# Patient Record
Sex: FEMALE | Race: WHITE | Employment: FULL TIME | URBAN - NONMETROPOLITAN AREA
[De-identification: names, ages, dates, MRNs, and addresses within clinical notes are randomized per-mention and may not be internally consistent; named-entity substitution may affect disease eponyms.]

---

## 2019-11-20 ENCOUNTER — OFFICE VISIT (OUTPATIENT)
Dept: PRIMARY CARE CLINIC | Age: 49
End: 2019-11-20
Payer: COMMERCIAL

## 2019-11-20 VITALS
HEART RATE: 104 BPM | HEIGHT: 66 IN | DIASTOLIC BLOOD PRESSURE: 80 MMHG | WEIGHT: 139 LBS | BODY MASS INDEX: 22.34 KG/M2 | SYSTOLIC BLOOD PRESSURE: 136 MMHG | OXYGEN SATURATION: 98 % | TEMPERATURE: 98.6 F

## 2019-11-20 DIAGNOSIS — M21.921 ACQUIRED DEFORMITY OF RIGHT UPPER ARM: Primary | ICD-10-CM

## 2019-11-20 DIAGNOSIS — G47.00 INSOMNIA, UNSPECIFIED TYPE: ICD-10-CM

## 2019-11-20 DIAGNOSIS — J30.9 ALLERGIC RHINITIS, UNSPECIFIED SEASONALITY, UNSPECIFIED TRIGGER: ICD-10-CM

## 2019-11-20 PROCEDURE — 99203 OFFICE O/P NEW LOW 30 MIN: CPT | Performed by: PEDIATRICS

## 2019-11-20 RX ORDER — FLUTICASONE PROPIONATE 50 MCG
2 SPRAY, SUSPENSION (ML) NASAL DAILY
Qty: 1 BOTTLE | Refills: 5 | Status: SHIPPED | OUTPATIENT
Start: 2019-11-20 | End: 2020-04-01 | Stop reason: SDUPTHER

## 2019-11-20 RX ORDER — QUETIAPINE 150 MG/1
150 TABLET, FILM COATED, EXTENDED RELEASE ORAL NIGHTLY
COMMUNITY
End: 2020-03-04 | Stop reason: SDUPTHER

## 2019-11-20 RX ORDER — MELOXICAM 15 MG/1
15 TABLET ORAL DAILY
Qty: 30 TABLET | Refills: 3 | Status: SHIPPED | OUTPATIENT
Start: 2019-11-20 | End: 2020-03-04

## 2019-11-20 ASSESSMENT — ENCOUNTER SYMPTOMS
RESPIRATORY NEGATIVE: 1
EYES NEGATIVE: 1
ALLERGIC/IMMUNOLOGIC NEGATIVE: 1
GASTROINTESTINAL NEGATIVE: 1

## 2019-11-22 ENCOUNTER — TELEPHONE (OUTPATIENT)
Dept: PRIMARY CARE CLINIC | Age: 49
End: 2019-11-22

## 2020-01-08 ENCOUNTER — HOSPITAL ENCOUNTER (OUTPATIENT)
Dept: NEUROLOGY | Age: 50
Discharge: HOME | End: 2020-01-08
Payer: COMMERCIAL

## 2020-01-08 PROCEDURE — 95886 MUSC TEST DONE W/N TEST COMP: CPT

## 2020-01-08 PROCEDURE — 95886 MUSC TEST DONE W/N TEST COMP: CPT | Performed by: PSYCHIATRY & NEUROLOGY

## 2020-01-08 PROCEDURE — 95909 NRV CNDJ TST 5-6 STUDIES: CPT

## 2020-01-08 PROCEDURE — 95909 NRV CNDJ TST 5-6 STUDIES: CPT | Performed by: PSYCHIATRY & NEUROLOGY

## 2020-03-04 ENCOUNTER — OFFICE VISIT (OUTPATIENT)
Dept: PRIMARY CARE CLINIC | Age: 50
End: 2020-03-04
Payer: COMMERCIAL

## 2020-03-04 VITALS
BODY MASS INDEX: 23.39 KG/M2 | SYSTOLIC BLOOD PRESSURE: 124 MMHG | DIASTOLIC BLOOD PRESSURE: 90 MMHG | TEMPERATURE: 99 F | HEART RATE: 82 BPM | HEIGHT: 67 IN | OXYGEN SATURATION: 98 % | WEIGHT: 149 LBS

## 2020-03-04 PROCEDURE — 99214 OFFICE O/P EST MOD 30 MIN: CPT | Performed by: PEDIATRICS

## 2020-03-04 RX ORDER — QUETIAPINE 150 MG/1
150 TABLET, FILM COATED, EXTENDED RELEASE ORAL NIGHTLY
Qty: 30 TABLET | Refills: 0 | Status: SHIPPED | OUTPATIENT
Start: 2020-03-04 | End: 2020-06-04 | Stop reason: ALTCHOICE

## 2020-03-04 RX ORDER — CLONAZEPAM 1 MG/1
1 TABLET ORAL 3 TIMES DAILY PRN
Qty: 90 TABLET | Refills: 0 | Status: SHIPPED | OUTPATIENT
Start: 2020-03-04 | End: 2020-06-04 | Stop reason: SDUPTHER

## 2020-03-04 RX ORDER — GABAPENTIN 100 MG/1
100 CAPSULE ORAL 3 TIMES DAILY
COMMUNITY
End: 2021-02-18

## 2020-03-04 RX ORDER — CLONAZEPAM 1 MG/1
1 TABLET ORAL 2 TIMES DAILY PRN
Qty: 60 TABLET | Refills: 0 | Status: CANCELLED | OUTPATIENT
Start: 2020-03-04 | End: 2020-04-19

## 2020-03-04 RX ORDER — CLONAZEPAM 1 MG/1
1 TABLET ORAL 2 TIMES DAILY PRN
COMMUNITY
End: 2020-03-04 | Stop reason: SDUPTHER

## 2020-03-04 ASSESSMENT — PATIENT HEALTH QUESTIONNAIRE - PHQ9
SUM OF ALL RESPONSES TO PHQ9 QUESTIONS 1 & 2: 0
1. LITTLE INTEREST OR PLEASURE IN DOING THINGS: 0
SUM OF ALL RESPONSES TO PHQ QUESTIONS 1-9: 0
SUM OF ALL RESPONSES TO PHQ QUESTIONS 1-9: 0
2. FEELING DOWN, DEPRESSED OR HOPELESS: 0

## 2020-03-04 ASSESSMENT — ENCOUNTER SYMPTOMS
ALLERGIC/IMMUNOLOGIC NEGATIVE: 1
GASTROINTESTINAL NEGATIVE: 1
RESPIRATORY NEGATIVE: 1
EYES NEGATIVE: 1

## 2020-03-04 NOTE — PROGRESS NOTES
[facility], [address]  Phone [phone]   Fax [phone]      OFFICE VISIT: 3/4/2020    Harman Greenberg-: 1970      [facility]  Reason For Visit:  Anais Gold is a 52 y.o. Follow-up; Health Maintenance (Cervical Cancer Screen, Mammogram, Colonoscopy, Flu shot, Pmeumonia Shot); and Medication Refill (Seroquel, Clonazepam)      Patient presents on follow-up. Present concerns:  She continues to have some anxiety and difficulty concentrating at times. In the past she was on Adderall for ADHD. She felt that this may have exacerbated her anxiety and she has stopped it. She was also on fluoxetine but did not feel that this helped much at all. She has weaned off of this as well. She does feel that clonazepam does help her. She does take this continued on a regular basis    She is requesting refills of some medications. She is requesting a refill of Seroquel. She takes this for anxiety  We discussed other options in regards to psychotropic medications. Based upon our discussion we came down to the possibility of Trintellix 10 mg in combination with Rexulti 1 mg may be a very good combination for her and may help with anxiety from a prophylactic standpoint but also help to control symptoms without weight gain and without sedation that we can see with Seroquel    She is also requesting a refill of clonazepam.  She takes this for anxiety. Last fill of clonazepam 1 mg  She has anxiety that she takes clonazepam on a prn basis. She tries not to take this if she can. She is a generally anxious person. height is 5' 7\" (1.702 m) and weight is 149 lb (67.6 kg). Her temporal temperature is 99 °F (37.2 °C). Her blood pressure is 124/90 (abnormal) and her pulse is 82. Her oxygen saturation is 98%. Body mass index is 23.34 kg/m². I have reviewed the following with the Ms. Greenberg   Lab Review  No results found for any previous visit.      Copies of these are in the chart.    Current Outpatient Medications   Medication Sig Dispense Refill    gabapentin (NEURONTIN) 100 MG capsule Take 100 mg by mouth 3 times daily.  QUEtiapine (SEROQUEL XR) 150 MG TB24 extended release tablet Take 1 tablet by mouth nightly 30 tablet 0    clonazePAM (KLONOPIN) 1 MG tablet Take 1 tablet by mouth 3 times daily as needed for Anxiety for up to 30 days. 90 tablet 0    fluticasone (FLONASE) 50 MCG/ACT nasal spray 2 sprays by Each Nostril route daily 1 Bottle 5     No current facility-administered medications for this visit. Allergies: Sulfa antibiotics     Past Medical History:   Diagnosis Date    Insomnia        Family History   Problem Relation Age of Onset    Ulcerative Colitis Mother     High Blood Pressure Father     High Blood Pressure Brother     Crohn's Disease Maternal Aunt     Ulcerative Colitis Paternal Aunt     Ulcerative Colitis Paternal Uncle     Colon Cancer Paternal Grandfather        Past Surgical History:   Procedure Laterality Date    BREAST BIOPSY Left 2017    HEMORRHOID SURGERY  01/2018    TUBAL LIGATION         Social History     Tobacco Use    Smoking status: Current Some Day Smoker     Packs/day: 0.10     Years: 25.00     Pack years: 2.50     Types: Cigarettes    Smokeless tobacco: Never Used    Tobacco comment: just a social smoker   Substance Use Topics    Alcohol use: Yes     Comment: socailly        Review of Systems   Constitutional: Negative. HENT: Negative. Eyes: Negative. Respiratory: Negative. Cardiovascular: Negative. Gastrointestinal: Negative. Endocrine: Negative. Genitourinary: Negative. Musculoskeletal: Negative. Skin: Negative. Allergic/Immunologic: Negative. Neurological: Negative. Hematological: Negative. Psychiatric/Behavioral: Negative. Physical Exam  Vitals signs and nursing note reviewed. Constitutional:       General: She is not in acute distress.      Appearance: She is

## 2020-03-05 NOTE — PATIENT INSTRUCTIONS
Patient Education        Learning About Anxiety Disorders  What are anxiety disorders? Anxiety disorders are a type of medical problem. They cause severe anxiety. When you feel anxious, you feel that something bad is about to happen. This feeling interferes with your life. These disorders include:  · Generalized anxiety disorder. You feel worried and stressed about many everyday events and activities. This goes on for several months and disrupts your life on most days. · Panic disorder. You have repeated panic attacks. A panic attack is a sudden, intense fear or anxiety. It may make you feel short of breath. Your heart may pound. · Social anxiety disorder. You feel very anxious about what you will say or do in front of people. For example, you may be scared to talk or eat in public. This problem affects your daily life. · Phobias. You are very scared of a specific object, situation, or activity. For example, you may fear spiders, high places, or small spaces. What are the symptoms? Generalized anxiety disorder  Symptoms may include:  · Feeling worried and stressed about many things almost every day. · Feeling tired or irritable. You may have a hard time concentrating. · Having headaches or muscle aches. · Having a hard time getting to sleep or staying asleep. Panic disorder  You may have repeated panic attacks when there is no reason for feeling afraid. You may change your daily activities because you worry that you will have another attack. Symptoms may include:  · Intense fear, terror, or anxiety. · Trouble breathing or very fast breathing. · Chest pain or tightness. · A heartbeat that races or is not regular. Social anxiety disorder  Symptoms may include:  · Fear about a social situation, such as eating in front of others or speaking in public. You may worry a lot. Or you may be afraid that something bad will happen. · Anxiety that can cause you to blush, sweat, and feel shaky.   · A heartbeat link.  Current as of: April 7, 2019  Content Version: 12.3  © 9045-7519 SeekSherpa. Care instructions adapted under license by [facility] ([facility]). If you have questions about a medical condition or this instruction, always ask your healthcare professional. [address] any warranty or liability for your use of this information. Patient Education        Learning About Stress  What is stress? Stress is what you feel when you have to handle more than you are used to. Stress is a fact of life for most people, and it affects everyone differently. What causes stress for you may not be stressful for someone else. A lot of things can cause stress. You may feel stress when you go on a job interview, take a test, or run a race. This kind of short-term stress is normal and even useful. It can help you if you need to work hard or react quickly. For example, stress can help you finish an important job on time. Stress also can last a long time. Long-term stress is caused by stressful situations or events. Examples of long-term stress include long-term health problems, ongoing problems at work, or conflicts in your family. Long-term stress can harm your health. How does stress affect your health? When you are stressed, your body responds as though you are in danger. It makes hormones that speed up your heart, make you breathe faster, and give you a burst of energy. This is called the fight-or-flight stress response. If the stress is over quickly, your body goes back to normal and no harm is done. But if stress happens too often or lasts too long, it can have bad effects. Long-term stress can make you more likely to get sick, and it can make symptoms of some diseases worse. If you tense up when you are stressed, you may develop neck, shoulder, or low back pain. Stress is linked to high blood pressure and heart disease. Stress also harms your emotional health.  It can make you villegas, tense, or depressed. Your relationships may suffer, and you may not do well at work or school. What can you do to manage stress? How to relax your mind  · Write. It may help to write about things that are bothering you. This helps you find out how much stress you feel and what is causing it. When you know this, you can find better ways to cope. · Let your feelings out. Talk, laugh, cry, and express anger when you need to. Talking with friends, family, a counselor, or a member of the clergy about your feelings is a healthy way to relieve stress. · Do something you enjoy. For example, listen to music or go to a movie. Practice your hobby or do volunteer work. · Meditate. This can help you relax, because you are not worrying about what happened before or what may happen in the future. · Do guided imagery. Imagine yourself in any setting that helps you feel calm. You can use audiotapes, books, or a teacher to guide you. How to relax your body  · Do something active. Exercise or activity can help reduce stress. Walking is a great way to get started. Even everyday activities such as housecleaning or yard work can help. · Do breathing exercises. For example:  ? From a standing position, bend forward from the waist with your knees slightly bent. Let your arms dangle close to the floor. ? Breathe in slowly and deeply as you return to a standing position. Roll up slowly and lift your head last.  ? Hold your breath for just a few seconds in the standing position. ? Breathe out slowly and bend forward from the waist.  · Try yoga or isabelle chi. These techniques combine exercise and meditation. You may need some training at first to learn them. What can you do to prevent stress? · Manage your time. This helps you find time to do the things you want and need to do. · Get enough sleep. Your body recovers from the stresses of the day while you are sleeping. · Get support.  Your family, friends, and community can make a difference in how you experience stress. Where can you learn more? Go to https://chpepiceweb.Zero Locus. org and sign in to your LP Amina account. Enter I470 in the Graphene Frontiers box to learn more about \"Learning About Stress. \"     If you do not have an account, please click on the \"Sign Up Now\" link. Current as of: April 7, 2019  Content Version: 12.3  © 8492-1577 Mowbly. Care instructions adapted under license by [facility] ([facility]). If you have questions about a medical condition or this instruction, always ask your healthcare professional. [address] any warranty or liability for your use of this information. Patient Education        Learning About Guided Imagery for Stress  What are guided imagery and stress? Stress is what you feel when you have to handle more than you are used to. A lot of things can cause stress. You may feel stress when you go on a job interview, take a test, or run a race. This kind of short-term stress is normal and even useful. It can help you if you need to work hard or react quickly. Stress also can last a long time. Long-term stress is caused by stressful situations or events. Examples of long-term stress include long-term health problems, ongoing problems at work, and conflicts in your family. Long-term stress can harm your health. Guided imagery is a technique of directed thoughts and suggestions that guide your mind toward a relaxed, focused state. This technique helps you use your mind to take you to a calm, peaceful place. You can use it to relax, which can lower blood pressure and reduce other problems related to stress. How does guided imagery help to relieve stress? Because of the way the mind and body are connected, guided imagery can make you feel like you are experiencing something just by imagining it.  You can achieve a relaxed state when you imagine all the details of a safe, comfortable place, such as a also a good idea to know your test results and keep a list of the medicines you take. Where can you learn more? Go to https://chpepiceweb.Citic Shenzhen. org and sign in to your Optimata account. Enter N291 in the ITM Power box to learn more about \"Learning About Guided Imagery for Stress. \"     If you do not have an account, please click on the \"Sign Up Now\" link. Current as of: April 7, 2019  Content Version: 12.3  © 7514-7297 Cloud Imperium Games. Care instructions adapted under license by [facility] ([facility]). If you have questions about a medical condition or this instruction, always ask your healthcare professional. [address] any warranty or liability for your use of this information. Patient Education        Learning About Mindfulness for Stress  What are mindfulness and stress? Stress is what you feel when you have to handle more than you are used to. A lot of things can cause stress. You may feel stress when you go on a job interview, take a test, or run a race. This kind of short-term stress is normal and even useful. It can help you if you need to work hard or react quickly. Stress also can last a long time. Long-term stress is caused by stressful situations or events. Examples of long-term stress include long-term health problems, ongoing problems at work, and conflicts in your family. Long-term stress can harm your health. Mindfulness is a focus only on things happening in the present moment. It's a process of purposefully paying attention to and being aware of your surroundings, your emotions, your thoughts, and how your body feels. You are aware of these things, but you aren't judging these experiences as \"good\" or \"bad. \" Mindfulness can help you learn to calm your mind and body to help you cope with illness, pain, and stress. How does mindfulness help to relieve stress? Mindfulness can help quiet your mind and relax your body.  Studies show that it can help some people sleep better, feel less anxious, and bring their blood pressure down. And it's been shown to help some people live and cope better with certain health problems like heart disease, depression, chronic pain, and cancer. How do you practice mindfulness? To be mindful is to pay attention, to be present, and to be accepting. · When you're mindful, you do just one thing and you pay close attention to that one thing. For example, you may sit quietly and notice your emotions or how your food tastes and smells. · When you're present, you focus on the things that are happening right now. You let go of your thoughts about the past and the future. When you dwell on the past or the future, you miss moments that can heal and strengthen you. You may miss moments like hearing a child laugh or seeing a friendly face when you think you're all alone. · When you're accepting, you don't  the present moment. Instead you accept your thoughts and feelings as they come. You can practice anytime, anywhere, and in any way you choose. You can practice in many ways. Here are a few ideas:  · While doing your chores, like washing the dishes, let your mind focus on what's in your hand. What does the dish feel like? Is the water warm or cold? · Go outside and take a few deep breaths. What is the air like? Is it warm or cold? · When you can, take some time at the start of your day to sit alone and think. · Take a slow walk by yourself. Count your steps while you breathe in and out. · Try yoga breathing exercises, stretches, and poses to strengthen and relax your muscles. · At work, if you can, try to stop for a few moments each hour. Note how your body feels. Let yourself regroup and let your mind settle before you return to what you were doing. · If you struggle with anxiety or \"worry thoughts,\" imagine your mind as a blue florencio and your worry thoughts as clouds.  Now imagine those worry thoughts floating across your mind's florencio. Just let them pass by as you watch. Follow-up care is a key part of your treatment and safety. Be sure to make and go to all appointments, and call your doctor if you are having problems. It's also a good idea to know your test results and keep a list of the medicines you take. Where can you learn more? Go to https://chpepiceweb.Netasq. org and sign in to your Foundations in Learning account. Enter J455 in the Valor Water Analytics box to learn more about \"Learning About Mindfulness for Stress. \"     If you do not have an account, please click on the \"Sign Up Now\" link. Current as of: April 7, 2019  Content Version: 12.3  © 7897-5430 Healthwise, Kapture Audio. Care instructions adapted under license by [facility] ([facility]). If you have questions about a medical condition or this instruction, always ask your healthcare professional. [place] [address] any warranty or liability for your use of this information. Patient Education        Learning About Progressive Muscle Relaxation for Stress  What are progressive muscle relaxation and stress? Stress is what you feel when you have to handle more than you are used to. A lot of things can cause stress. You may feel stress when you go on a job interview, take a test, or run a race. This kind of short-term stress is normal and even useful. It can help you if you need to work hard or react quickly. Stress also can last a long time. Long-term stress is caused by stressful situations or events. Examples of long-term stress include long-term health problems, ongoing problems at work, and conflicts in your family. Long-term stress can harm your health. When you have anxiety or stress in your life, one of the ways your body responds is with muscle tension. Progressive muscle relaxation is a method that helps relieve that tension. How does progressive muscle relaxation reduce stress?   The body responds to stress with muscle tension, which can cause pain or discomfort. In turn, tense muscles relay to the body that it's stressed. That keeps the cycle of stress and muscle tension going. Progressive muscle relaxation helps break this cycle by reducing muscle tension and general mental anxiety. This method often helps people get to sleep. How do you do progressive muscle relaxation? To do progressive muscle relaxation, first you tense a group of muscles as you breathe in. Then you relax them as you breathe out. Notice how your muscles feel when you relax them. You work on your muscle groups in a certain order. Through practice, you can learn to feel the difference between a tensed muscle and a relaxed muscle. Then you can learn how to turn on this relaxed state at the first sign of a muscle tensing up due to stress. Practicing this method for a few weeks will help you get better at this skill. In time you'll be able to use this method to relieve stress. When you first start, it may help to use an audio recording until you learn all the muscle groups in order. Check online for these recordings. Choose a place where you won't be interrupted. It should have space for you to lie down on your back and stretch out comfortably, such as on a carpeted floor. Follow-up care is a key part of your treatment and safety. Be sure to make and go to all appointments, and call your doctor if you are having problems. It's also a good idea to know your test results and keep a list of the medicines you take. Where can you learn more? Go to https://Optimal TechnologiesjonnieConversion Associates.RoboCent. org and sign in to your Havsjo Delikatesser account. Enter G180 in the DashBurst box to learn more about \"Learning About Progressive Muscle Relaxation for Stress. \"     If you do not have an account, please click on the \"Sign Up Now\" link. Current as of: April 7, 2019  Content Version: 12.3  © 9115-9681 Healthwise, Incorporated. Care instructions adapted under license by [facility] ([facility]).  If you have

## 2020-03-13 ENCOUNTER — TELEPHONE (OUTPATIENT)
Dept: PRIMARY CARE CLINIC | Age: 50
End: 2020-03-13

## 2020-03-13 RX ORDER — DEXTROMETHORPHAN HYDROBROMIDE AND PROMETHAZINE HYDROCHLORIDE 15; 6.25 MG/5ML; MG/5ML
5 SYRUP ORAL 4 TIMES DAILY PRN
Qty: 240 ML | Refills: 0 | Status: SHIPPED | OUTPATIENT
Start: 2020-03-13 | End: 2020-03-20

## 2020-03-13 NOTE — TELEPHONE ENCOUNTER
Pt calls back to say that she is not having any SOB and that if she does get worse she will go to ER.  Medication sent to pharmacy

## 2020-03-13 NOTE — TELEPHONE ENCOUNTER
Attempt to tang pt back to see if she is having SOB. Went straight to . I left a message to call back to let us know and to tell her that if is worse over the weekend to go to the ER.

## 2020-03-13 NOTE — TELEPHONE ENCOUNTER
As long as she is not short of breath. Can send in promethazine dm 5 ml po qid prn cough. #240. If she gets worse over the weekend, she should go to the ER.

## 2020-03-20 ENCOUNTER — TELEPHONE (OUTPATIENT)
Dept: PRIMARY CARE CLINIC | Age: 50
End: 2020-03-20

## 2020-03-25 ENCOUNTER — TELEPHONE (OUTPATIENT)
Dept: GASTROENTEROLOGY | Age: 50
End: 2020-03-25

## 2020-04-02 RX ORDER — FLUTICASONE PROPIONATE 50 MCG
2 SPRAY, SUSPENSION (ML) NASAL DAILY
Qty: 3 BOTTLE | Refills: 3 | Status: SHIPPED | OUTPATIENT
Start: 2020-04-02 | End: 2021-02-18

## 2020-04-02 NOTE — TELEPHONE ENCOUNTER
Received fax from pharmacy requesting refill on pts medication(s). Pt was last seen in office on 3/4/2020  and has a follow up scheduled for 2020. Will send request to  Dr. Aminata Grigsby  for patient.      Requested Prescriptions     Pending Prescriptions Disp Refills    fluticasone (FLONASE) 50 MCG/ACT nasal spray 1 Bottle 5     Si sprays by Each Nostril route daily

## 2020-04-08 ENCOUNTER — TELEPHONE (OUTPATIENT)
Dept: URGENT CARE | Age: 50
End: 2020-04-08

## 2020-05-20 ENCOUNTER — TELEPHONE (OUTPATIENT)
Dept: PRIMARY CARE CLINIC | Age: 50
End: 2020-05-20

## 2020-05-27 ENCOUNTER — TELEPHONE (OUTPATIENT)
Dept: PRIMARY CARE CLINIC | Age: 50
End: 2020-05-27

## 2020-05-27 NOTE — TELEPHONE ENCOUNTER
Called patient, spoke with: Patient regarding the results of the patients most recent mammo. I advised Patient of Dr. Edson Kirkpatrick recommendations.    Patient did voice understanding      [address] of right breast ordered

## 2020-06-04 ENCOUNTER — VIRTUAL VISIT (OUTPATIENT)
Dept: PRIMARY CARE CLINIC | Age: 50
End: 2020-06-04
Payer: COMMERCIAL

## 2020-06-04 PROCEDURE — 99213 OFFICE O/P EST LOW 20 MIN: CPT | Performed by: PEDIATRICS

## 2020-06-04 PROCEDURE — 99406 BEHAV CHNG SMOKING 3-10 MIN: CPT | Performed by: PEDIATRICS

## 2020-06-04 RX ORDER — VARENICLINE TARTRATE 1 MG/1
1 TABLET, FILM COATED ORAL 2 TIMES DAILY
Qty: 60 TABLET | Refills: 4 | Status: SHIPPED | OUTPATIENT
Start: 2020-06-04 | End: 2021-02-18

## 2020-06-04 RX ORDER — VARENICLINE TARTRATE
KIT
Qty: 1 BOX | Refills: 0 | Status: SHIPPED | OUTPATIENT
Start: 2020-06-04 | End: 2020-07-22

## 2020-06-04 RX ORDER — CLONAZEPAM 1 MG/1
1 TABLET ORAL 3 TIMES DAILY PRN
Qty: 90 TABLET | Refills: 0 | Status: SHIPPED | OUTPATIENT
Start: 2020-06-04 | End: 2020-08-31 | Stop reason: SDUPTHER

## 2020-06-04 ASSESSMENT — ENCOUNTER SYMPTOMS
EYES NEGATIVE: 1
ALLERGIC/IMMUNOLOGIC NEGATIVE: 1
GASTROINTESTINAL NEGATIVE: 1
RESPIRATORY NEGATIVE: 1

## 2020-06-04 NOTE — PROGRESS NOTES
tablet; Take by mouth. Dispense: 1 box; Refill: 0  - varenicline (CHANTIX CONTINUING MONTH ROD) 1 MG tablet; Take 1 tablet by mouth 2 times daily  Dispense: 60 tablet; Refill: 4      No orders of the defined types were placed in this encounter. Return in about 3 months (around 9/4/2020) for 15Anjelica Gracia is a 48 y.o. female being evaluated by a Virtual Visit (video visit) encounter to address concerns as mentioned above. A caregiver was present when appropriate. Due to this being a TeleHealth encounter (During [facility] public health emergency), evaluation of the following organ systems was limited: Vitals/Constitutional/EENT/Resp/CV/GI//MS/Neuro/Skin/Heme-Lymph-Imm. Pursuant to the emergency declaration under the [address] [place] [address], [address] [place] [address] authority and the [employer] and [employer] Act, this Virtual Visit was conducted with patient's (and/or legal guardian's) consent, to reduce the patient's risk of exposure to COVID-19 and provide necessary medical care. The patient (and/or legal guardian) has also been advised to contact this office for worsening conditions or problems, and seek emergency medical treatment and/or call 911 if deemed necessary. Patient identification was verified at the start of the visit: Yes    Total time spent for this encounter: 15m    Services were provided through a video synchronous discussion virtually to substitute for in-person clinic visit. Patient and provider were located at their individual homes. --LETA Dillard DO on 6/4/2020 at 12:22 PM    An electronic signature was used to authenticate this note.

## 2020-06-04 NOTE — PATIENT INSTRUCTIONS
counseling along with using medicine can raise your chances of quitting even more. · If you smoke fewer than 5 cigarettes a day, you may not need medicines to help you quit smoking. · These medicines have less nicotine than cigarettes. And by itself, nicotine is not nearly as harmful as smoking. The tars, carbon monoxide, and other toxic chemicals in tobacco cause the harmful effects. · The side effects of nicotine replacement products depend on the type of product. For example, a patch can make your skin red and itchy. Medicines in pill form can make you sick to your stomach. They can also cause dry mouth and trouble sleeping. For most people, the side effects are not bad enough to make them stop using the products. Why might you choose to use medicines to quit smoking? · You have tried on your own to stop smoking, but you were not able to stop. · You smoke more than 5 cigarettes a day. · You want to increase your chances of quitting smoking. · You want to reduce your cravings and withdrawal symptoms. · You feel the benefits of medicine outweigh the side effects. Why might you choose not to use medicine? · You want to try quitting on your own by stopping all at once (\"cold turkey\"). · You want to cut back slowly on the number of cigarettes you smoke. · You smoke fewer than 5 cigarettes a day. · You do not like using medicine. · You feel the side effects of medicines outweigh the benefits. · You are worried about the cost of medicines. Your decision  Thinking about the facts and your feelings can help you make a decision that is right for you. Be sure you understand the benefits and risks of your options, and think about what else you need to do before you make the decision. Where can you learn more? Go to https://mojgan.[employer]. org and sign in to your [employer] account. Enter K365 in the [employer] box to learn more about \"Deciding About Using Medicines To Quit Smoking. \" part of your treatment and safety. Be sure to make and go to all appointments, and call your doctor if you are having problems. It's also a good idea to know your test results and keep a list of the medicines you take. How can you care for yourself at home? · Ask your family, friends, and coworkers for support. You have a better chance of quitting if you have help and support. · Join a support group, such as Nicotine Anonymous, for people who are trying to quit smoking. · Consider signing up for a smoking cessation program, such as the American Lung Association's Freedom from Smoking program.  · Get text messaging support. Go to the website at www.smokefree. gov to sign up for the [facility] program.  · Set a quit date. Pick your date carefully so that it is not right in the middle of a big deadline or stressful time. Once you quit, do not even take a puff. Get rid of all ashtrays and lighters after your last cigarette. Clean your house and your clothes so that they do not smell of smoke. · Learn how to be a nonsmoker. Think about ways you can avoid those things that make you reach for a cigarette. ? Avoid situations that put you at greatest risk for smoking. For some people, it is hard to have a drink with friends without smoking. For others, they might skip a coffee break with coworkers who smoke. ? Change your daily routine. Take a different route to work or eat a meal in a different place. · Cut down on stress. Calm yourself or release tension by doing an activity you enjoy, such as reading a book, taking a hot bath, or gardening. · Talk to your doctor or pharmacist about nicotine replacement therapy, which replaces the nicotine in your body. You still get nicotine but you do not use tobacco. Nicotine replacement products help you slowly reduce the amount of nicotine you need. These products come in several forms, many of them available over-the-counter:  ? Nicotine patches  ?  Nicotine gum and

## 2020-06-10 ENCOUNTER — TELEPHONE (OUTPATIENT)
Dept: PRIMARY CARE CLINIC | Age: 50
End: 2020-06-10

## 2020-07-22 RX ORDER — VARENICLINE TARTRATE
KIT
Qty: 1 BOX | Refills: 0 | Status: SHIPPED | OUTPATIENT
Start: 2020-07-22 | End: 2021-02-18

## 2020-07-22 RX ORDER — CLONAZEPAM 1 MG/1
1 TABLET ORAL 3 TIMES DAILY PRN
Qty: 90 TABLET | Refills: 0 | OUTPATIENT
Start: 2020-07-22 | End: 2020-08-21

## 2020-08-18 RX ORDER — QUETIAPINE 150 MG/1
TABLET, FILM COATED, EXTENDED RELEASE ORAL
Qty: 30 TABLET | Refills: 1 | OUTPATIENT
Start: 2020-08-18

## 2020-08-19 ENCOUNTER — TELEPHONE (OUTPATIENT)
Dept: PRIMARY CARE CLINIC | Age: 50
End: 2020-08-19

## 2020-08-19 NOTE — TELEPHONE ENCOUNTER
Received fax from pharmacy requesting refill on pts medication(s). Pt was last seen in office on 6/4/2020  and has a follow up scheduled for Visit date not found. Will send request to  Dr. Jacinta Ortiz  for patient.      Requested Prescriptions     Pending Prescriptions Disp Refills    brexpiprazole (REXULTI) 1 MG TABS tablet 90 tablet 3     Sig: Take 1 tablet by mouth daily    VORTIoxetine (TRINTELLIX) 10 MG TABS tablet 90 tablet 3     Sig: Take 1 tablet by mouth daily

## 2020-08-20 RX ORDER — CLONAZEPAM 1 MG/1
1 TABLET ORAL 3 TIMES DAILY PRN
Qty: 90 TABLET | Refills: 0 | OUTPATIENT
Start: 2020-08-20 | End: 2020-09-19

## 2020-08-31 ENCOUNTER — VIRTUAL VISIT (OUTPATIENT)
Dept: PRIMARY CARE CLINIC | Age: 50
End: 2020-08-31
Payer: MEDICAID

## 2020-08-31 PROCEDURE — 99213 OFFICE O/P EST LOW 20 MIN: CPT | Performed by: PEDIATRICS

## 2020-08-31 RX ORDER — ESCITALOPRAM OXALATE 20 MG/1
20 TABLET ORAL DAILY
Qty: 30 TABLET | Refills: 11 | Status: SHIPPED | OUTPATIENT
Start: 2020-08-31 | End: 2020-09-30 | Stop reason: SDUPTHER

## 2020-08-31 RX ORDER — CLONAZEPAM 1 MG/1
1 TABLET ORAL 3 TIMES DAILY PRN
Qty: 90 TABLET | Refills: 0 | Status: SHIPPED | OUTPATIENT
Start: 2020-08-31 | End: 2020-09-30 | Stop reason: SDUPTHER

## 2020-08-31 NOTE — PROGRESS NOTES
[facility], [address]  Phone [phone]   Fax [phone]      OFFICE VISIT: 2020    Saida Greenberg-: 1970      HPI  Reason For Visit:  June Welsh is a 48 y.o. Anxiety    Patient presents via doxy. ME video conferencing on follow-up for chronic anxiety. She does take clonazepam 1 mg for this as well. Last prescription refill for clonazepam 1 mg was on 2020 for number 90 tablets. WILLA was reviewed today per office protocol. Report shows No discrepancies. Fill pattern is consistent from single provider(s) at single pharmacy(s). Request #[id number]    She lost her insurance and Trintellix is very expensive. We talked about options and we are going to try Lexapro       vitals were not taken for this visit. There is no height or weight on file to calculate BMI. I have reviewed the following with the Ms. Adititer   Lab Review  No visits with results within 6 Month(s) from this visit. Latest known visit with results is:   No results found for any previous visit. Copies of these are in the chart. Current Outpatient Medications   Medication Sig Dispense Refill    escitalopram (LEXAPRO) 20 MG tablet Take 1 tablet by mouth daily 30 tablet 11    clonazePAM (KLONOPIN) 1 MG tablet Take 1 tablet by mouth 3 times daily as needed for Anxiety for up to 30 days. 90 tablet 0    brexpiprazole (REXULTI) 1 MG TABS tablet Take 1 tablet by mouth daily 90 tablet 3    VORTIoxetine (TRINTELLIX) 10 MG TABS tablet Take 1 tablet by mouth daily 90 tablet 3    varenicline (CHANTIX STARTING MONTH ROD) 0.5 MG X 11 & 1 MG X 42 tablet USE AS DIRECTED 1 box 0    varenicline (CHANTIX CONTINUING MONTH ROD) 1 MG tablet Take 1 tablet by mouth 2 times daily 60 tablet 4    fluticasone (FLONASE) 50 MCG/ACT nasal spray 2 sprays by Each Nostril route daily 3 Bottle 3    gabapentin (NEURONTIN) 100 MG capsule Take 100 mg by mouth 3 times daily.        No current facility-administered medications for this visit. Allergies: Sulfa antibiotics     Past Medical History:   Diagnosis Date    Insomnia        Family History   Problem Relation Age of Onset    Ulcerative Colitis Mother     High Blood Pressure Father     High Blood Pressure Brother     Crohn's Disease Maternal Aunt     Ulcerative Colitis Paternal Aunt     Ulcerative Colitis Paternal Uncle     Colon Cancer Paternal Grandfather        Past Surgical History:   Procedure Laterality Date    BREAST BIOPSY Left 2017    COLONOSCOPY      HEMORRHOID SURGERY  01/2018    TUBAL LIGATION         Social History     Tobacco Use    Smoking status: Current Some Day Smoker     Packs/day: 0.10     Years: 25.00     Pack years: 2.50     Types: Cigarettes    Smokeless tobacco: Never Used    Tobacco comment: just a social smoker   Substance Use Topics    Alcohol use: Yes     Comment: socailly        Review of Systems   Constitutional: Negative. HENT: Negative. Eyes: Negative. Respiratory: Negative. Cardiovascular: Negative. Gastrointestinal: Negative. Endocrine: Negative. Genitourinary: Negative. Musculoskeletal: Negative. Skin: Negative. Allergic/Immunologic: Negative. Neurological: Negative. Hematological: Negative. Psychiatric/Behavioral: The patient is nervous/anxious (stressful time in life. ). She has been smoking more recently. Physical Exam  Physical exam was not performed today as this was a video teleconference visit using doxy. ME      ASSESSMENT      ICD-10-CM    1. Generalized anxiety disorder  F41.1 escitalopram (LEXAPRO) 20 MG tablet     clonazePAM (KLONOPIN) 1 MG tablet         PLAN    1. Generalized anxiety disorder  We are going to change from Trintellix to Lexapro due to insurance reasons. Hopefully this will provide a little bit of extra relief from her anxiety but not going to the bank  - escitalopram (LEXAPRO) 20 MG tablet;  Take 1 tablet by mouth daily  Dispense: 30 tablet; Refill: 11  - clonazePAM (KLONOPIN) 1 MG tablet; Take 1 tablet by mouth 3 times daily as needed for Anxiety for up to 30 days. Dispense: 90 tablet; Refill: 0      No orders of the defined types were placed in this encounter. Return in about 1 month (around 9/30/2020) for 15Anjelica Barone is a 48 y.o. female being evaluated by a Virtual Visit (video visit) encounter to address concerns as mentioned above. A caregiver was present when appropriate. Due to this being a TeleHealth encounter (During AERW-80 public health emergency), evaluation of the following organ systems was limited: Vitals/Constitutional/EENT/Resp/CV/GI//MS/Neuro/Skin/Heme-Lymph-Imm. Pursuant to the emergency declaration under the [address] [place] [address], [address] [place] [address] authority and the [employer] and [employer] Act, this Virtual Visit was conducted with patient's (and/or legal guardian's) consent, to reduce the patient's risk of exposure to COVID-19 and provide necessary medical care. The patient (and/or legal guardian) has also been advised to contact this office for worsening conditions or problems, and seek emergency medical treatment and/or call 911 if deemed necessary. Patient identification was verified at the start of the visit: yes    Total time spent for this encounter: 15m    Services were provided through a video synchronous discussion virtually to substitute for in-person clinic visit. Patient and provider were located at their individual homes. --LETA Alva DO on 8/31/2020 at 4:58 PM    An electronic signature was used to authenticate this note.

## 2020-09-30 ENCOUNTER — TELEMEDICINE (OUTPATIENT)
Dept: PRIMARY CARE CLINIC | Age: 50
End: 2020-09-30
Payer: MEDICAID

## 2020-09-30 PROCEDURE — 99213 OFFICE O/P EST LOW 20 MIN: CPT | Performed by: PEDIATRICS

## 2020-09-30 RX ORDER — CLONAZEPAM 1 MG/1
1 TABLET ORAL 3 TIMES DAILY PRN
Qty: 90 TABLET | Refills: 0 | Status: SHIPPED | OUTPATIENT
Start: 2020-09-30 | End: 2020-10-02 | Stop reason: SDUPTHER

## 2020-09-30 RX ORDER — ESCITALOPRAM OXALATE 20 MG/1
20 TABLET ORAL DAILY
Qty: 135 TABLET | Refills: 3 | Status: SHIPPED | OUTPATIENT
Start: 2020-09-30 | End: 2020-12-07 | Stop reason: SDUPTHER

## 2020-09-30 NOTE — PROGRESS NOTES
1-1/2 tablets on a daily basis. Return to try to order a 90-day supply of this medication so we will make it easier for her from that standpoint. We will also refill the clonazepam and follow-up in 1 month  - escitalopram (LEXAPRO) 20 MG tablet; Take 1 tablet by mouth daily  Dispense: 135 tablet; Refill: 3  - clonazePAM (KLONOPIN) 1 MG tablet; Take 1 tablet by mouth 3 times daily as needed for Anxiety for up to 30 days. Dispense: 90 tablet; Refill: 0      No orders of the defined types were placed in this encounter. Return in about 1 month (around 10/30/2020) for 15Anjelica Garcia is a 48 y.o. female being evaluated by a Virtual Visit (video visit) encounter to address concerns as mentioned above. A caregiver was present when appropriate. Due to this being a TeleHealth encounter ([facility]), evaluation of the following organ systems was limited: Vitals/Constitutional/EENT/Resp/CV/GI//MS/Neuro/Skin/Heme-Lymph-Imm. Pursuant to the emergency declaration under the [address] [place] [address], [address] [place] [address] authority and the [employer] and [employer] Act, this Virtual Visit was conducted with patient's (and/or legal guardian's) consent, to reduce the patient's risk of exposure to COVID-19 and provide necessary medical care. The patient (and/or legal guardian) has also been advised to contact this office for worsening conditions or problems, and seek emergency medical treatment and/or call 911 if deemed necessary. Patient identification was verified at the start of the visit: Yes    Total time spent for this encounter: 15m    Services were provided through a video synchronous discussion virtually to substitute for in-person clinic visit. Patient and provider were located at their individual homes. --LETA Toribio DO on 9/30/2020 at 4:19 PM    An electronic signature was used to authenticate this note.

## 2020-10-02 ENCOUNTER — PATIENT MESSAGE (OUTPATIENT)
Dept: PRIMARY CARE CLINIC | Age: 50
End: 2020-10-02

## 2020-10-02 RX ORDER — CLONAZEPAM 1 MG/1
1 TABLET ORAL 3 TIMES DAILY PRN
Qty: 90 TABLET | Refills: 0 | Status: SHIPPED | OUTPATIENT
Start: 2020-10-02 | End: 2020-10-29 | Stop reason: SDUPTHER

## 2020-10-02 NOTE — TELEPHONE ENCOUNTER
From: Torrey Figueroa  To: Rajeev Slaughter,   Sent: 10/2/2020 10:39 AM CDT  Subject: Prescription Question    Could you please re request my clonazapem. I had my appointment with Dr. Carisa Alva on 9/30 and it was called in that day but insurance isn't covering it because the last script was 8/31. It was called in a day too soon I guess.  Thank you very much  [facility]

## 2020-10-20 ENCOUNTER — LAB REQUISITION (OUTPATIENT)
Dept: LAB | Facility: HOSPITAL | Age: 50
End: 2020-10-20

## 2020-10-20 DIAGNOSIS — Z00.00 ROUTINE GENERAL MEDICAL EXAMINATION AT A HEALTH CARE FACILITY: ICD-10-CM

## 2020-10-20 PROCEDURE — 88305 TISSUE EXAM BY PATHOLOGIST: CPT | Performed by: INTERNAL MEDICINE

## 2020-10-21 PROCEDURE — 88305 TISSUE EXAM BY PATHOLOGIST: CPT | Performed by: INTERNAL MEDICINE

## 2020-10-23 LAB
LAB AP CASE REPORT: NORMAL
LAB AP CLINICAL INFORMATION: NORMAL
PATH REPORT.FINAL DX SPEC: NORMAL
PATH REPORT.GROSS SPEC: NORMAL

## 2020-10-29 ENCOUNTER — TELEMEDICINE (OUTPATIENT)
Dept: PRIMARY CARE CLINIC | Age: 50
End: 2020-10-29
Payer: MEDICAID

## 2020-10-29 PROCEDURE — 99442 PR PHYS/QHP TELEPHONE EVALUATION 11-20 MIN: CPT | Performed by: PEDIATRICS

## 2020-10-29 RX ORDER — CLONAZEPAM 1 MG/1
1 TABLET ORAL 3 TIMES DAILY PRN
Qty: 90 TABLET | Refills: 0 | Status: SHIPPED | OUTPATIENT
Start: 2020-10-29 | End: 2020-12-07 | Stop reason: SDUPTHER

## 2020-10-29 ASSESSMENT — ENCOUNTER SYMPTOMS
GASTROINTESTINAL NEGATIVE: 1
EYES NEGATIVE: 1
ALLERGIC/IMMUNOLOGIC NEGATIVE: 1
RESPIRATORY NEGATIVE: 1

## 2020-10-29 NOTE — PROGRESS NOTES
[facility], [address]  Phone [phone]   Fax [phone]      OFFICE VISIT: 10/29/2020    Amelia Greenberg-: 1970      [facility]  Reason For Visit:  Mariola Tran is a 48 y.o. Anxiety    Patient presents via telephone conference on follow-up for her chronic anxiety. She does take clonazepam for her breakthrough anxiety. Last prescription for clonazepam 1 mg was on 10/2/2020 for number 90 tablets. She is taking this 3 times daily on a scheduled basis for now. We are trying to get her anxiety under excellent control. We did discuss a weaning plan over the next few months. She is also taking Lexapro 30 mg daily. In addition she is also taking Rexulti 1 mg daily. This medication regimen is effective at managing her anxiety fairly well. Ilene Aguilera was unavailable at the time of this evaluation. She is a very low risk for diversion       vitals were not taken for this visit. There is no height or weight on file to calculate BMI. I have reviewed the following with the Ms. Greenberg   Lab Review  No visits with results within 6 Month(s) from this visit. Latest known visit with results is:   No results found for any previous visit. Copies of these are in the chart. Current Outpatient Medications   Medication Sig Dispense Refill    clonazePAM (KLONOPIN) 1 MG tablet Take 1 tablet by mouth 3 times daily as needed for Anxiety for up to 30 days.  90 tablet 0    escitalopram (LEXAPRO) 20 MG tablet Take 1 tablet by mouth daily 135 tablet 3    brexpiprazole (REXULTI) 1 MG TABS tablet Take 1 tablet by mouth daily 90 tablet 3    varenicline (CHANTIX STARTING MONTH ROD) 0.5 MG X 11 & 1 MG X 42 tablet USE AS DIRECTED 1 box 0    varenicline (CHANTIX CONTINUING MONTH ROD) 1 MG tablet Take 1 tablet by mouth 2 times daily 60 tablet 4    fluticasone (FLONASE) 50 MCG/ACT nasal spray 2 sprays by Each Nostril route daily 3 Bottle 3    gabapentin (NEURONTIN) 100 MG capsule Take 100 mg by mouth 3 times daily. No current facility-administered medications for this visit. Allergies: Sulfa antibiotics     Past Medical History:   Diagnosis Date    Insomnia        Family History   Problem Relation Age of Onset    Ulcerative Colitis Mother     High Blood Pressure Father     High Blood Pressure Brother     Crohn's Disease Maternal Aunt     Ulcerative Colitis Paternal Aunt     Ulcerative Colitis Paternal Uncle     Colon Cancer Paternal Grandfather        Past Surgical History:   Procedure Laterality Date    BREAST BIOPSY Left 2017    COLONOSCOPY      HEMORRHOID SURGERY  01/2018    TUBAL LIGATION         Social History     Tobacco Use    Smoking status: Current Some Day Smoker     Packs/day: 0.10     Years: 25.00     Pack years: 2.50     Types: Cigarettes    Smokeless tobacco: Never Used    Tobacco comment: just a social smoker   Substance Use Topics    Alcohol use: Yes     Comment: socailly        Review of Systems   Constitutional: Negative. HENT: Negative. Eyes: Negative. Respiratory: Negative. Cardiovascular: Negative. Gastrointestinal: Negative. Endocrine: Negative. Genitourinary: Negative. Musculoskeletal: Negative. Skin: Negative. Allergic/Immunologic: Negative. Neurological: Negative. Hematological: Negative. Psychiatric/Behavioral: The patient is nervous/anxious (stressful time in life, but better control). She has been smoking more recently. Physical Exam  Physical exam was not performed as this was a telephone conference visit      ASSESSMENT      ICD-10-CM    1. Generalized anxiety disorder  F41.1 clonazePAM (KLONOPIN) 1 MG tablet         PLAN    1. Generalized anxiety disorder  Doing well on present medication regimen. We will refill her clonazepam today  - clonazePAM (KLONOPIN) 1 MG tablet; Take 1 tablet by mouth 3 times daily as needed for Anxiety for up to 30 days. Dispense: 90 tablet;  Refill: 0      No orders of the defined types were placed in this encounter. Return in about 1 month (around 11/29/2020) for 15. Due to patients co-morbidities and risk for potential exposure of COVID 19 pandemic. Patient was contacted and agreed to proceed with a telephone virtual visit. The risks and benefits of converting to a telephone virtual visit were discussed in light of the current infectious disease epidemic. Patient also understood that insurance coverage and co-pays are up to their individual insurance plans. Provider performed history of present illness and review of systems. Diagnosis and treatment plan was discussed with patient. Pharmacy of choice was reviewed along with past medical history, medication allergies, and current medications. Education provided to patient or patient parents/guardian with current illness diagnosis as well as when to seek additional healthcare due to changing or for worsening symptoms. Patient voiced understanding. 15 minutes were spent on the phone with patient. Hattie Gardiner is a 48 y.o. female being evaluated by a Virtual Visit (Telephone visit) encounter to address concerns as mentioned above. A caregiver was present when appropriate. Due to this being a TeleHealth encounter (During [facility] public [facility]), evaluation of the following organ systems was limited: Vitals/Constitutional/EENT/Resp/CV/GI//MS/Neuro/Skin/Heme-Lymph-Imm. Pursuant to the emergency declaration under the [address] [place] [address], [address] [place] [address] [facility] authority and the [employer] and [employer] Act, this Virtual Visit was conducted with patient's (and/or legal guardian's) consent, to reduce the patient's risk of exposure to COVID-19 and provide necessary medical care.   The patient (and/or legal guardian) has also been advised to contact this office for worsening conditions or problems, and seek emergency medical treatment and/or call 911 if deemed necessary. Patient identification was verified at the start of the visit: Yes    Total time spent for this encounter: 15m    Services were provided through a video synchronous discussion virtually to substitute for in-person clinic visit. Patient and provider were located at their individual homes. --LETA Jorge DO on 10/29/2020 at 5:43 PM    An electronic signature was used to authenticate this note.

## 2020-10-29 NOTE — PATIENT INSTRUCTIONS
Patient Education        Learning About Generalized Anxiety Disorder  What is generalized anxiety disorder? We all worry. It's a normal part of life. But when you have generalized anxiety disorder, you worry about lots of things and have a hard time stopping your worry. This worry or anxiety interferes with your relationships, work, and life. What causes it? The cause is not known. But it may be passed down through families. What are the symptoms? You may feel anxious or worry most days about things like work, relationships, health, or money. You may worry about things that are unlikely to happen. You find it hard to stop or control the worry. Because you worry a lot and try hard to stop worrying, you may feel restless, tired, tense, or cranky. You may also find it hard to think or sleep. And you may have headaches or an upset stomach. How is it diagnosed? Your doctor will ask about your health and how often you worry or feel anxious. He or she may ask about other symptoms, like whether you:  · Feel restless. · Feel tired. · Have a hard time thinking or feel that your mind goes blank. · Feel cranky. · Have tense muscles. · Have sleep problems. A physical exam and tests can help make sure that your symptoms aren't caused by a different condition, such as a thyroid problem. How is it treated? Counseling and medicine can both work to treat anxiety. The two are often used along with lifestyle changes. Cognitive-behavioral therapy (CBT) is a type of counseling that's used to help treat anxiety. In CBT, you learn how to notice and replace thoughts that make you feel worried. It also can help you learn how to relax when you worry. Medicines can help. These medicines are often also used for depression. Selective serotonin reuptake inhibitors (SSRIs) are often tried first. But there are other medicines that your doctor may use. You may need to try a few medicines to find one that works well.   Many people feel better by getting regular exercise, eating healthy meals, and getting good sleep. Mindfulness--focusing on things that happen in the present moment--also can help reduce your anxiety. What can you expect when you have it? Having anxiety can be upsetting. Some people might feel less worried and stressed after a couple of months of treatment. But for other people, it might take longer to feel better. Reaching out to people for help is important. Try not to isolate yourself. Let your family and friends help you. Find someone you can trust and confide in. Talk to that person. When you know what anxiety is--and how you can get help for it--you can start to learn new ways of thinking. This can help you cope and work through your anxiety. Follow-up care is a key part of your treatment and safety. Be sure to make and go to all appointments, and call your doctor if you are having problems. It's also a good idea to know your test results and keep a list of the medicines you take. Where can you learn more? Go to https://TasteBookpeInformed Trades.Profyle. org and sign in to your [employer] account. Enter G110 in the [employer] box to learn more about \"Learning About Generalized Anxiety Disorder. \"     If you do not have an account, please click on the \"Sign Up Now\" link. Current as of: January 31, 2020               Content Version: 12.6  © 1303-9698 [employer]. Care instructions adapted under license by [facility] [place] ([facility]). If you have questions about a medical condition or this instruction, always ask your healthcare professional. [place] [address] any warranty or liability for your use of this information.

## 2020-12-07 ENCOUNTER — VIRTUAL VISIT (OUTPATIENT)
Dept: PRIMARY CARE CLINIC | Age: 50
End: 2020-12-07
Payer: MEDICAID

## 2020-12-07 PROCEDURE — 99213 OFFICE O/P EST LOW 20 MIN: CPT | Performed by: PEDIATRICS

## 2020-12-07 RX ORDER — CLONAZEPAM 1 MG/1
1 TABLET ORAL 3 TIMES DAILY PRN
Qty: 90 TABLET | Refills: 0 | Status: SHIPPED | OUTPATIENT
Start: 2020-12-07 | End: 2021-01-07 | Stop reason: SDUPTHER

## 2020-12-07 RX ORDER — ESCITALOPRAM OXALATE 20 MG/1
30 TABLET ORAL DAILY
Qty: 135 TABLET | Refills: 3 | Status: SHIPPED | OUTPATIENT
Start: 2020-12-07 | End: 2021-08-05 | Stop reason: SDUPTHER

## 2020-12-07 ASSESSMENT — ENCOUNTER SYMPTOMS
GASTROINTESTINAL NEGATIVE: 1
ALLERGIC/IMMUNOLOGIC NEGATIVE: 1
RESPIRATORY NEGATIVE: 1
EYES NEGATIVE: 1

## 2020-12-07 NOTE — PROGRESS NOTES
[address] [facility], [address]  Phone [phone]   Fax [phone]      OFFICE VISIT: 2020    Miguel Greenberg-: 1970      HPI  Reason For Visit:  Calista Mayen is a 48 y.o. Medication Problem    Patient presents via doxy. Me video conferencing with concerns about Needing a refill of her clonazepam and she has an issue with Lexapro as the way the prescription was written they will not let her get her prescription refilled. She has been taking 30 mg a day which is 1-1/2 of her 20 mg tablets. The prescription was written for the correct quantity but it did say 20 mg instead of 30 mg. The pharmacy is stating that it is too soon to get her medication refilled. We are going to fix this today. She does need a refill of clonazepam as noted above. She does take clonazepam for her breakthrough anxiety. Last prescription for clonazepam 1 mg was on 10/29/2020 for number 90 tablets. She is taking this 3 times daily on a scheduled basis for now. We are trying to get her anxiety under excellent control. We did discuss a weaning plan over the next few months. In addition she is also taking Rexulti 1 mg daily. This medication regimen is effective at managing her anxiety fairly well.     Maureen Amos was unavailable at the time of this evaluation. She is a very low risk for diversion             vitals were not taken for this visit. There is no height or weight on file to calculate BMI. I have reviewed the following with the MsAnjelica Yari   Lab Review  No visits with results within 6 Month(s) from this visit. Latest known visit with results is:   No results found for any previous visit. Copies of these are in the chart.     Current Outpatient Medications   Medication Sig Dispense Refill    escitalopram (LEXAPRO) 20 MG tablet Take 1.5 tablets by mouth daily 135 tablet 3    clonazePAM (KLONOPIN) 1 MG tablet Take 1 tablet by mouth 3 times daily as needed for Anxiety for up to 30 days. 90 tablet 0    brexpiprazole (REXULTI) 1 MG TABS tablet Take 1 tablet by mouth daily 90 tablet 3    varenicline (CHANTIX STARTING MONTH PAK) 0.5 MG X 11 & 1 MG X 42 tablet USE AS DIRECTED 1 box 0    varenicline (CHANTIX CONTINUING MONTH PAK) 1 MG tablet Take 1 tablet by mouth 2 times daily 60 tablet 4    fluticasone (FLONASE) 50 MCG/ACT nasal spray 2 sprays by Each Nostril route daily 3 Bottle 3    gabapentin (NEURONTIN) 100 MG capsule Take 100 mg by mouth 3 times daily. No current facility-administered medications for this visit. Allergies: Sulfa antibiotics     Past Medical History:   Diagnosis Date    Insomnia        Family History   Problem Relation Age of Onset    Ulcerative Colitis Mother     High Blood Pressure Father     High Blood Pressure Brother     Crohn's Disease Maternal Aunt     Ulcerative Colitis Paternal Aunt     Ulcerative Colitis Paternal Uncle     Colon Cancer Paternal Grandfather        Past Surgical History:   Procedure Laterality Date    BREAST BIOPSY Left 2017    COLONOSCOPY      HEMORRHOID SURGERY  01/2018    TUBAL LIGATION         Social History     Tobacco Use    Smoking status: Current Some Day Smoker     Packs/day: 0.10     Years: 25.00     Pack years: 2.50     Types: Cigarettes    Smokeless tobacco: Never Used    Tobacco comment: just a social smoker   Substance Use Topics    Alcohol use: Yes     Comment: socailly        Review of Systems   Constitutional: Negative. HENT: Negative. Eyes: Negative. Respiratory: Negative. Cardiovascular: Negative. Gastrointestinal: Negative. Endocrine: Negative. Genitourinary: Negative. Musculoskeletal: Negative. Skin: Negative. Allergic/Immunologic: Negative. Neurological: Negative. Hematological: Negative. Psychiatric/Behavioral: The patient is nervous/anxious (stressful time in life, but better control). She has been smoking more recently. in-person clinic visit. Patient and provider were located at their individual homes. --LETA Lyn DO on 12/7/2020 at 11:56 AM    An electronic signature was used to authenticate this note.

## 2020-12-07 NOTE — PATIENT INSTRUCTIONS
Patient Education        Anxiety Disorder: Care Instructions  Your Care Instructions     Anxiety is a normal reaction to stress. Difficult situations can cause you to have symptoms such as sweaty palms and a nervous feeling. In an anxiety disorder, the symptoms are far more severe. Constant worry, muscle tension, trouble sleeping, nausea and diarrhea, and other symptoms can make normal daily activities difficult or impossible. These symptoms may occur for no reason, and they can affect your work, school, or social life. Medicines, counseling, and self-care can all help. Follow-up care is a key part of your treatment and safety. Be sure to make and go to all appointments, and call your doctor if you are having problems. It's also a good idea to know your test results and keep a list of the medicines you take. How can you care for yourself at home? · Take medicines exactly as directed. Call your doctor if you think you are having a problem with your medicine. · Go to your counseling sessions and follow-up appointments. · Recognize and accept your anxiety. Then, when you are in a situation that makes you anxious, say to yourself, \"This is not an emergency. I feel uncomfortable, but I am not in danger. I can keep going even if I feel anxious. \"  · Be kind to your body:  ? Relieve tension with exercise or a massage. ? Get enough rest.  ? Avoid alcohol, caffeine, nicotine, and illegal drugs. They can increase your anxiety level and cause sleep problems. ? Learn and do relaxation techniques. See below for more about these techniques. · Engage your mind. Get out and do something you enjoy. Go to a funny movie, or take a walk or hike. Plan your day. Having too much or too little to do can make you anxious. · Keep a record of your symptoms. Discuss your fears with a good friend or family member, or join a support group for people with similar problems. Talking to others sometimes relieves stress.   · Get involved in social groups, or volunteer to help others. Being alone sometimes makes things seem worse than they are. · Get at least 30 minutes of exercise on most days of the week to relieve stress. Walking is a good choice. You also may want to do other activities, such as running, swimming, cycling, or playing tennis or team sports. Relaxation techniques  Do relaxation exercises 10 to 20 minutes a day. You can play soothing, relaxing music while you do them, if you wish. · Tell others in your house that you are going to do your relaxation exercises. Ask them not to disturb you. · Find a comfortable place, away from all distractions and noise. · Lie down on your back, or sit with your back straight. · Focus on your breathing. Make it slow and steady. · Breathe in through your nose. Breathe out through either your nose or mouth. · Breathe deeply, filling up the area between your navel and your rib cage. Breathe so that your belly goes up and down. · Do not hold your breath. · Breathe like this for 5 to 10 minutes. Notice the feeling of calmness throughout your whole body. As you continue to breathe slowly and deeply, relax by doing the following for another 5 to 10 minutes:  · Tighten and relax each muscle group in your body. You can begin at your toes and work your way up to your head. · Imagine your muscle groups relaxing and becoming heavy. · Empty your mind of all thoughts. · Let yourself relax more and more deeply. · Become aware of the state of calmness that surrounds you. · When your relaxation time is over, you can bring yourself back to alertness by moving your fingers and toes and then your hands and feet and then stretching and moving your entire body. Sometimes people fall asleep during relaxation, but they usually wake up shortly afterward. · Always give yourself time to return to full alertness before you drive a car or do anything that might cause an accident if you are not fully alert.  Never of breath. Your heart may pound. · Social anxiety disorder. You feel very anxious about what you will say or do in front of people. For example, you may be scared to talk or eat in public. This problem affects your daily life. · Phobias. You are very scared of a specific object, situation, or activity. For example, you may fear spiders, high places, or small spaces. What are the symptoms? Generalized anxiety disorder  Symptoms may include:  · Feeling worried and stressed about many things almost every day. · Feeling tired or irritable. You may have a hard time concentrating. · Having headaches or muscle aches. · Having a hard time getting to sleep or staying asleep. Panic disorder  You may have repeated panic attacks when there is no reason for feeling afraid. You may change your daily activities because you worry that you will have another attack. Symptoms may include:  · Intense fear, terror, or anxiety. · Trouble breathing or very fast breathing. · Chest pain or tightness. · A heartbeat that races or is not regular. Social anxiety disorder  Symptoms may include:  · Fear about a social situation, such as eating in front of others or speaking in public. You may worry a lot. Or you may be afraid that something bad will happen. · Anxiety that can cause you to blush, sweat, and feel shaky. · A heartbeat that is faster than normal.  · A hard time focusing. Phobias  Symptoms may include:  · More fear than most people of being around an object, being in a situation, or doing an activity. You might also be stressed about the chance of being around the thing you fear. · Worry about losing control, panicking, fainting, or having physical symptoms like a faster heartbeat when you are around the situation or object. How are these disorders treated? Anxiety disorders can be treated with medicines or counseling. A combination of both may be used. Medicines may include:  · Antidepressants.  These may help your symptoms by keeping chemicals in your brain in balance. · Benzodiazepines. These may give you short-term relief of your symptoms. Some people use cognitive-behavioral therapy. A therapist helps you learn to change stressful or bad thoughts into helpful thoughts. Lead a healthy lifestyle  A healthy lifestyle may help you feel better. · Get at least 30 minutes of exercise on most days of the week. Walking is a good choice. · Eat a healthy diet. Include fruits, vegetables, lean proteins, and whole grains in your diet each day. · Try to go to bed at the same time every night. Try for 8 hours of sleep a night. · Find ways to manage stress. Try relaxation exercises. · Avoid alcohol and illegal drugs. Follow-up care is a key part of your treatment and safety. Be sure to make and go to all appointments, and call your doctor if you are having problems. It's also a good idea to know your test results and keep a list of the medicines you take. Where can you learn more? Go to https://[employer].Milanoo.com. org and sign in to your InDMusic account. Enter N815 in the GuestDriven box to learn more about \"Learning About Anxiety Disorders. \"     If you do not have an account, please click on the \"Sign Up Now\" link. Current as of: January 31, 2020               Content Version: 12.6  © 6951-9863 [employer]. Care instructions adapted under license by [facility] ([facility]). If you have questions about a medical condition or this instruction, always ask your healthcare professional. [place] [address] any warranty or liability for your use of this information. Patient Education        Learning About Generalized Anxiety Disorder  What is generalized anxiety disorder? We all worry. It's a normal part of life. But when you have generalized anxiety disorder, you worry about lots of things and have a hard time stopping your worry.  This worry or anxiety interferes with your relationships, work, and life. What causes it? The cause is not known. But it may be passed down through families. What are the symptoms? You may feel anxious or worry most days about things like work, relationships, health, or money. You may worry about things that are unlikely to happen. You find it hard to stop or control the worry. Because you worry a lot and try hard to stop worrying, you may feel restless, tired, tense, or cranky. You may also find it hard to think or sleep. And you may have headaches or an upset stomach. How is it diagnosed? Your doctor will ask about your health and how often you worry or feel anxious. He or she may ask about other symptoms, like whether you:  · Feel restless. · Feel tired. · Have a hard time thinking or feel that your mind goes blank. · Feel cranky. · Have tense muscles. · Have sleep problems. A physical exam and tests can help make sure that your symptoms aren't caused by a different condition, such as a thyroid problem. How is it treated? Counseling and medicine can both work to treat anxiety. The two are often used along with lifestyle changes. Cognitive-behavioral therapy (CBT) is a type of counseling that's used to help treat anxiety. In CBT, you learn how to notice and replace thoughts that make you feel worried. It also can help you learn how to relax when you worry. Medicines can help. These medicines are often also used for depression. Selective serotonin reuptake inhibitors (SSRIs) are often tried first. But there are other medicines that your doctor may use. You may need to try a few medicines to find one that works well. Many people feel better by getting regular exercise, eating healthy meals, and getting good sleep. Mindfulness--focusing on things that happen in the present moment--also can help reduce your anxiety. What can you expect when you have it? Having anxiety can be upsetting.  Some people might feel less worried and stressed after a couple of months of treatment. But for other people, it might take longer to feel better. Reaching out to people for help is important. Try not to isolate yourself. Let your family and friends help you. Find someone you can trust and confide in. Talk to that person. When you know what anxiety is--and how you can get help for it--you can start to learn new ways of thinking. This can help you cope and work through your anxiety. Follow-up care is a key part of your treatment and safety. Be sure to make and go to all appointments, and call your doctor if you are having problems. It's also a good idea to know your test results and keep a list of the medicines you take. Where can you learn more? Go to https://Met[employer]. org and sign in to your Bufys account. Enter G110 in the QR Wild box to learn more about \"Learning About Generalized Anxiety Disorder. \"     If you do not have an account, please click on the \"Sign Up Now\" link. Current as of: January 31, 2020               Content Version: 12.6  © 3582-5703 [employer]. Care instructions adapted under license by [facility] ([facility]). If you have questions about a medical condition or this instruction, always ask your healthcare professional. [address] any warranty or liability for your use of this information.

## 2021-01-07 ENCOUNTER — VIRTUAL VISIT (OUTPATIENT)
Dept: PRIMARY CARE CLINIC | Age: 51
End: 2021-01-07
Payer: COMMERCIAL

## 2021-01-07 DIAGNOSIS — F41.1 GENERALIZED ANXIETY DISORDER: ICD-10-CM

## 2021-01-07 PROCEDURE — 99213 OFFICE O/P EST LOW 20 MIN: CPT | Performed by: PEDIATRICS

## 2021-01-07 RX ORDER — CLONAZEPAM 1 MG/1
1 TABLET ORAL 3 TIMES DAILY PRN
Qty: 90 TABLET | Refills: 0 | Status: SHIPPED | OUTPATIENT
Start: 2021-01-07 | End: 2021-02-08 | Stop reason: SDUPTHER

## 2021-01-07 ASSESSMENT — ENCOUNTER SYMPTOMS
RESPIRATORY NEGATIVE: 1
GASTROINTESTINAL NEGATIVE: 1
ALLERGIC/IMMUNOLOGIC NEGATIVE: 1
EYES NEGATIVE: 1

## 2021-01-07 NOTE — PATIENT INSTRUCTIONS

## 2021-01-07 NOTE — PROGRESS NOTES
[facility] [facility], [address]  Phone [phone]   Fax [phone]      OFFICE VISIT: 2021    Chapo Penningtonkriss-: 1970      [facility]  Reason For Visit:  Rl Soler is a 48 y.o. Anxiety    Patient presents via Eventfind[employer] video conferencing on follow-up for chronic anxiety. She typically takes clonazepam 1 mg on a weekly basis for her anxiety. This medication is effective at managing her anxiety. She typically does take up to 3 a day to control her symptoms. Last prescription of clonazepam 1 mg was on 2020 for number 90 tablets  Adjunct medications:   Lexapro 20 mg daily   Rexulti 1 mg daily   Gabapentin 100 mg 3 times daily  Symptoms: Anxiety is controlled to functional state. WILLA was reviewed today per office protocol. Report shows No discrepancies. Fill pattern is consistent from single provider(s) at single pharmacy(s). Request #[id number]               vitals were not taken for this visit. There is no height or weight on file to calculate BMI. I have reviewed the following with the Ms. Greenberg   Lab Review  No visits with results within 6 Month(s) from this visit. Latest known visit with results is:   No results found for any previous visit. Copies of these are in the chart. Current Outpatient Medications   Medication Sig Dispense Refill    clonazePAM (KLONOPIN) 1 MG tablet Take 1 tablet by mouth 3 times daily as needed for Anxiety for up to 30 days.  90 tablet 0    escitalopram (LEXAPRO) 20 MG tablet Take 1.5 tablets by mouth daily 135 tablet 3    brexpiprazole (REXULTI) 1 MG TABS tablet Take 1 tablet by mouth daily 90 tablet 3    varenicline (CHANTIX STARTING MONTH ) 0.5 MG X 11 & 1 MG X 42 tablet USE AS DIRECTED 1 box 0    varenicline (CHANTIX CONTINUING MONTH ROD) 1 MG tablet Take 1 tablet by mouth 2 times daily 60 tablet 4    fluticasone (FLONASE) 50 MCG/ACT nasal spray 2 sprays by Each Nostril route daily 3 Bottle 3  gabapentin (NEURONTIN) 100 MG capsule Take 100 mg by mouth 3 times daily. No current facility-administered medications for this visit. Allergies: Sulfa antibiotics     Past Medical History:   Diagnosis Date    Insomnia        Family History   Problem Relation Age of Onset    Ulcerative Colitis Mother     High Blood Pressure Father     High Blood Pressure Brother     Crohn's Disease Maternal Aunt     Ulcerative Colitis Paternal Aunt     Ulcerative Colitis Paternal Uncle     Colon Cancer Paternal Grandfather        Past Surgical History:   Procedure Laterality Date    BREAST BIOPSY Left 2017    COLONOSCOPY      HEMORRHOID SURGERY  01/2018    TUBAL LIGATION         Social History     Tobacco Use    Smoking status: Current Some Day Smoker     Packs/day: 0.10     Years: 25.00     Pack years: 2.50     Types: Cigarettes    Smokeless tobacco: Never Used    Tobacco comment: just a social smoker   Substance Use Topics    Alcohol use: Yes     Comment: socailly        Review of Systems   Constitutional: Negative. HENT: Negative. Eyes: Negative. Respiratory: Negative. Cardiovascular: Negative. Gastrointestinal: Negative. Endocrine: Negative. Genitourinary: Negative. Musculoskeletal: Negative. Skin: Negative. Allergic/Immunologic: Negative. Neurological: Negative. Hematological: Negative. Psychiatric/Behavioral: The patient is nervous/anxious (stressful time in life, but better control). She has been smoking more recently. Physical Exam  Physical exam was not performed today as this was a video teleconference using doxy. Me      ASSESSMENT      ICD-10-CM    1. Generalized anxiety disorder  F41.1 clonazePAM (KLONOPIN) 1 MG tablet         PLAN    1. Generalized anxiety disorder  Will refill this medication for her. She has been under a lot of increased stress recently and this has allowed her to manage that fairly well. This combination of medications is effective for her both from an anxiety and depression standpoint. She has a much higher functionality with the medications than what she would have without them. She is not having any adverse effects from the medications as discussed today. She is very satisfied with the present regimen. We will continue this regimen as before  - clonazePAM (KLONOPIN) 1 MG tablet; Take 1 tablet by mouth 3 times daily as needed for Anxiety for up to 30 days. Dispense: 90 tablet; Refill: 0      No orders of the defined types were placed in this encounter. Return in about 1 month (around 2/7/2021) for 15. Natalia Patel is a 48 y.o. female being evaluated by a Virtual Visit (video visit) encounter to address concerns as mentioned above. A caregiver was present when appropriate. Due to this being a TeleHealth encounter (During [facility] public health emergency), evaluation of the following organ systems was limited: Vitals/Constitutional/EENT/Resp/CV/GI//MS/Neuro/Skin/Heme-Lymph-Imm. Pursuant to the emergency declaration under the [address] [place] [address], [address] [place] [address] authority and the [employer] Act, this Virtual Visit was conducted with patient's (and/or legal guardian's) consent, to reduce the patient's risk of exposure to COVID-19 and provide necessary medical care. The patient (and/or legal guardian) has also been advised to contact this office for worsening conditions or problems, and seek emergency medical treatment and/or call 911 if deemed necessary. Patient identification was verified at the start of the visit: Yes    Total time spent for this encounter: 20m    Services were provided through a video synchronous discussion virtually to substitute for in-person clinic visit. Patient and provider were located at their individual homes. --B. Darreld Gaucher, DO on 1/7/2021 at 6:04 PM An electronic signature was used to authenticate this note.

## 2021-02-08 ENCOUNTER — VIRTUAL VISIT (OUTPATIENT)
Dept: PRIMARY CARE CLINIC | Age: 51
End: 2021-02-08
Payer: MEDICAID

## 2021-02-08 DIAGNOSIS — F41.1 GENERALIZED ANXIETY DISORDER: ICD-10-CM

## 2021-02-08 PROCEDURE — 99213 OFFICE O/P EST LOW 20 MIN: CPT | Performed by: PEDIATRICS

## 2021-02-08 RX ORDER — CLONAZEPAM 1 MG/1
1 TABLET ORAL 3 TIMES DAILY PRN
Qty: 90 TABLET | Refills: 0 | Status: SHIPPED | OUTPATIENT
Start: 2021-02-08 | End: 2021-03-08 | Stop reason: SDUPTHER

## 2021-02-08 ASSESSMENT — ENCOUNTER SYMPTOMS
ALLERGIC/IMMUNOLOGIC NEGATIVE: 1
EYES NEGATIVE: 1
GASTROINTESTINAL NEGATIVE: 1
RESPIRATORY NEGATIVE: 1

## 2021-02-08 NOTE — PROGRESS NOTES
[address] [facility], [address]  Phone [phone]   Fax [phone]      OFFICE VISIT: 2021    Sergey Penningtonkriss-: 1970      HPI  Reason For Visit:  Milena is a 48 y.o. Anxiety (lexapro and clonazepam working great together, needing refills) and Medication Refill (clonazepam)    Patient presents via doxy. Me video conferencing on follow-up for chronic anxiety  She typically takes clonazepam 1 mg on a weekly basis for her anxiety. This medication is effective at managing her anxiety. She typically does take up to 3 a day to control her symptoms. Last prescription of clonazepam 1 mg was on 2020 for number 90 tablets  Adjunct medications:              Lexapro 20 mg daily              Rexulti 1 mg daily              Gabapentin 100 mg 3 times daily  Symptoms: Anxiety is controlled to functional state.     WILLA was reviewed today per office protocol. Report shows No discrepancies. Fill pattern is consistent from single provider(s) at single pharmacy(s). Request # [id number]      vitals were not taken for this visit. There is no height or weight on file to calculate BMI. I have reviewed the following with the Ms. Greenberg   Lab Review  No visits with results within 6 Month(s) from this visit. Latest known visit with results is:   No results found for any previous visit. Copies of these are in the chart. Current Outpatient Medications   Medication Sig Dispense Refill    clonazePAM (KLONOPIN) 1 MG tablet Take 1 tablet by mouth 3 times daily as needed for Anxiety for up to 30 days.  90 tablet 0    escitalopram (LEXAPRO) 20 MG tablet Take 1.5 tablets by mouth daily 135 tablet 3    brexpiprazole (REXULTI) 1 MG TABS tablet Take 1 tablet by mouth daily 90 tablet 3    varenicline (CHANTIX STARTING MONTH ) 0.5 MG X 11 & 1 MG X 42 tablet USE AS DIRECTED 1 box 0    varenicline (CHANTIX CONTINUING MONTH ) 1 MG tablet Take 1 tablet by mouth 2 times daily 60 tablet 4    fluticasone (FLONASE) 50 MCG/ACT nasal spray 2 sprays by Each Nostril route daily 3 Bottle 3    gabapentin (NEURONTIN) 100 MG capsule Take 100 mg by mouth 3 times daily. No current facility-administered medications for this visit. Allergies: Sulfa antibiotics     Past Medical History:   Diagnosis Date    Insomnia        Family History   Problem Relation Age of Onset    Ulcerative Colitis Mother     High Blood Pressure Father     High Blood Pressure Brother     Crohn's Disease Maternal Aunt     Ulcerative Colitis Paternal Aunt     Ulcerative Colitis Paternal Uncle     Colon Cancer Paternal Grandfather        Past Surgical History:   Procedure Laterality Date    BREAST BIOPSY Left 2017    COLONOSCOPY      HEMORRHOID SURGERY  01/2018    TUBAL LIGATION         Social History     Tobacco Use    Smoking status: Current Some Day Smoker     Packs/day: 0.10     Years: 25.00     Pack years: 2.50     Types: Cigarettes    Smokeless tobacco: Never Used    Tobacco comment: just a social smoker   Substance Use Topics    Alcohol use: Yes     Comment: socailly        Review of Systems   Constitutional: Negative. HENT: Negative. Eyes: Negative. Respiratory: Negative. Cardiovascular: Negative. Gastrointestinal: Negative. Endocrine: Negative. Genitourinary: Negative. Musculoskeletal: Negative. Skin: Negative. Allergic/Immunologic: Negative. Neurological: Negative. Hematological: Negative. Psychiatric/Behavioral: The patient is nervous/anxious (stressful time in life, but better control). She has been smoking more recently. Physical Exam  Physical exam was not performed today as this was a video teleconference visit using doxy. Me      ASSESSMENT      ICD-10-CM    1. Generalized anxiety disorder  F41.1 clonazePAM (KLONOPIN) 1 MG tablet         PLAN    1.  Generalized anxiety disorder  Continue with present medication regimen as

## 2021-02-08 NOTE — PATIENT INSTRUCTIONS
Patient Education        Learning About Generalized Anxiety Disorder  What is generalized anxiety disorder? We all worry. It's a normal part of life. But when you have generalized anxiety disorder, you worry about lots of things and have a hard time stopping your worry. This worry or anxiety interferes with your relationships, work, and life. What causes it? The cause is not known. But it may be passed down through families. What are the symptoms? You may feel anxious or worry most days about things like work, relationships, health, or money. You may worry about things that are unlikely to happen. You find it hard to stop or control the worry. Because you worry a lot and try hard to stop worrying, you may feel restless, tired, tense, or cranky. You may also find it hard to think or sleep. And you may have headaches or an upset stomach. How is it diagnosed? Your doctor will ask about your health and how often you worry or feel anxious. He or she may ask about other symptoms, like whether you:  · Feel restless. · Feel tired. · Have a hard time thinking or feel that your mind goes blank. · Feel cranky. · Have tense muscles. · Have sleep problems. A physical exam and tests can help make sure that your symptoms aren't caused by a different condition, such as a thyroid problem. How is it treated? Counseling and medicine can both work to treat anxiety. The two are often used along with lifestyle changes. Cognitive-behavioral therapy (CBT) is a type of counseling that's used to help treat anxiety. In CBT, you learn how to notice and replace thoughts that make you feel worried. It also can help you learn how to relax when you worry. Medicines can help. These medicines are often also used for depression. Selective serotonin reuptake inhibitors (SSRIs) are often tried first. But there are other medicines that your doctor may use. You may need to try a few medicines to find one that works well. Many people feel better by getting regular exercise, eating healthy meals, and getting good sleep. Mindfulnessfocusing on things that happen in the present momentalso can help reduce your anxiety. What can you expect when you have it? Having anxiety can be upsetting. Some people might feel less worried and stressed after a couple of months of treatment. But for other people, it might take longer to feel better. Reaching out to people for help is important. Try not to isolate yourself. Let your family and friends help you. Find someone you can trust and confide in. Talk to that person. When you know what anxiety isand how you can get help for ityou can start to learn new ways of thinking. This can help you cope and work through your anxiety. Follow-up care is a key part of your treatment and safety. Be sure to make and go to all appointments, and call your doctor if you are having problems. It's also a good idea to know your test results and keep a list of the medicines you take. Where can you learn more? Go to https://Wiki-PR.Appointuit. org and sign in to your Reddit account. Enter G110 in the Netli box to learn more about \"Learning About Generalized Anxiety Disorder. \"     If you do not have an account, please click on the \"Sign Up Now\" link. Current as of: January 31, 2020               Content Version: 12.6  © 5102-4375 [employer]. Care instructions adapted under license by [facility] ([facility]). If you have questions about a medical condition or this instruction, always ask your healthcare professional. [place] [address] any warranty or liability for your use of this information.

## 2021-02-18 ENCOUNTER — TELEMEDICINE (OUTPATIENT)
Dept: PRIMARY CARE CLINIC | Age: 51
End: 2021-02-18
Payer: MEDICAID

## 2021-02-18 DIAGNOSIS — J30.9 ALLERGIC RHINITIS, UNSPECIFIED SEASONALITY, UNSPECIFIED TRIGGER: ICD-10-CM

## 2021-02-18 DIAGNOSIS — J01.00 ACUTE NON-RECURRENT MAXILLARY SINUSITIS: Primary | ICD-10-CM

## 2021-02-18 PROCEDURE — 3017F COLORECTAL CA SCREEN DOC REV: CPT | Performed by: PEDIATRICS

## 2021-02-18 PROCEDURE — G8428 CUR MEDS NOT DOCUMENT: HCPCS | Performed by: PEDIATRICS

## 2021-02-18 PROCEDURE — 99213 OFFICE O/P EST LOW 20 MIN: CPT | Performed by: PEDIATRICS

## 2021-02-18 RX ORDER — CEFDINIR 300 MG/1
300 CAPSULE ORAL 2 TIMES DAILY
Qty: 20 CAPSULE | Refills: 0 | Status: SHIPPED | OUTPATIENT
Start: 2021-02-18 | End: 2021-02-28

## 2021-02-18 ASSESSMENT — ENCOUNTER SYMPTOMS
SORE THROAT: 1
RHINORRHEA: 1
SINUS PRESSURE: 1
SINUS PAIN: 1

## 2021-02-18 NOTE — PROGRESS NOTES
Allergies: Sulfa antibiotics     Past Medical History:   Diagnosis Date    Insomnia        Family History   Problem Relation Age of Onset    Ulcerative Colitis Mother     High Blood Pressure Father     High Blood Pressure Brother     Crohn's Disease Maternal Aunt     Ulcerative Colitis Paternal Aunt     Ulcerative Colitis Paternal Uncle     Colon Cancer Paternal Grandfather        Past Surgical History:   Procedure Laterality Date    BREAST BIOPSY Left 2017    COLONOSCOPY      HEMORRHOID SURGERY  01/2018    TUBAL LIGATION         Social History     Tobacco Use    Smoking status: Current Some Day Smoker     Packs/day: 0.10     Years: 25.00     Pack years: 2.50     Types: Cigarettes    Smokeless tobacco: Never Used    Tobacco comment: just a social smoker   Substance Use Topics    Alcohol use: Yes     Comment: socailly        Review of Systems   HENT: Positive for congestion, postnasal drip, rhinorrhea, sinus pressure, sinus pain and sore throat (scratchy). Physical Exam  Physical exam was not performed as this was a video teleconference visit using [address] [place] [address] Road    1. Acute non-recurrent maxillary sinusitis  J01.00 cefdinir (OMNICEF) 300 MG capsule         PLAN    1. Acute non-recurrent maxillary sinusitis  Will treat this aggressively. - cefdinir (OMNICEF) 300 MG capsule; Take 1 capsule by mouth 2 times daily for 10 days  Dispense: 20 capsule; Refill: 0      No orders of the defined types were placed in this encounter. Return in about 1 month (around 3/18/2021), or Or as scheduled. Say Hernandez is a 48 y.o. female being evaluated by a Virtual Visit (video visit) encounter to address concerns as mentioned above. A caregiver was present when appropriate.  Due to this being a TeleHealth encounter (During [facility]- public health emergency), evaluation of the following organ systems was limited: Vitals/Constitutional/EENT/Resp/CV/GI//MS/Neuro/Skin/Heme-Lymph-Imm. Pursuant to the emergency declaration under the [address] [place] [address], [address] [place] [address] and the [employer] and [employer] Act, this Virtual Visit was conducted with patient's (and/or legal guardian's) consent, to reduce the patient's risk of exposure to COVID-19 and provide necessary medical care. The patient (and/or legal guardian) has also been advised to contact this office for worsening conditions or problems, and seek emergency medical treatment and/or call 911 if deemed necessary. Patient identification was verified at the start of the visit: Yes    Total time spent for this encounter: 20m    Services were provided through a video synchronous discussion virtually to substitute for in-person clinic visit. Patient and provider were located at their individual homes. --LETA Esteves DO on 2/18/2021 at 4:04 PM    An electronic signature was used to authenticate this note.

## 2021-03-08 ENCOUNTER — TELEMEDICINE (OUTPATIENT)
Dept: PRIMARY CARE CLINIC | Age: 51
End: 2021-03-08
Payer: MEDICAID

## 2021-03-08 DIAGNOSIS — F41.1 GENERALIZED ANXIETY DISORDER: ICD-10-CM

## 2021-03-08 PROCEDURE — 99213 OFFICE O/P EST LOW 20 MIN: CPT | Performed by: PEDIATRICS

## 2021-03-08 PROCEDURE — 3017F COLORECTAL CA SCREEN DOC REV: CPT | Performed by: PEDIATRICS

## 2021-03-08 PROCEDURE — G8427 DOCREV CUR MEDS BY ELIG CLIN: HCPCS | Performed by: PEDIATRICS

## 2021-03-08 RX ORDER — CLONAZEPAM 1 MG/1
1 TABLET ORAL 3 TIMES DAILY PRN
Qty: 90 TABLET | Refills: 0 | Status: SHIPPED | OUTPATIENT
Start: 2021-03-08 | End: 2021-04-13 | Stop reason: SDUPTHER

## 2021-03-08 ASSESSMENT — ENCOUNTER SYMPTOMS
EYES NEGATIVE: 1
GASTROINTESTINAL NEGATIVE: 1
RESPIRATORY NEGATIVE: 1
ALLERGIC/IMMUNOLOGIC NEGATIVE: 1

## 2021-03-08 ASSESSMENT — PATIENT HEALTH QUESTIONNAIRE - PHQ9: 1. LITTLE INTEREST OR PLEASURE IN DOING THINGS: 1

## 2021-03-08 NOTE — PATIENT INSTRUCTIONS
Patient Education        Learning About Generalized Anxiety Disorder  What is generalized anxiety disorder? We all worry. It's a normal part of life. But when you have generalized anxiety disorder, you worry about lots of things and have a hard time stopping your worry. This worry or anxiety interferes with your relationships, work, and life. What causes it? The cause is not known. But it may be passed down through families. What are the symptoms? You may feel anxious or worry most days about things like work, relationships, health, or money. You may worry about things that are unlikely to happen. You find it hard to stop or control the worry. Because you worry a lot and try hard to stop worrying, you may feel restless, tired, tense, or cranky. You may also find it hard to think or sleep. And you may have headaches or an upset stomach. How is it diagnosed? Your doctor will ask about your health and how often you worry or feel anxious. He or she may ask about other symptoms, like whether you:  · Feel restless. · Feel tired. · Have a hard time thinking or feel that your mind goes blank. · Feel cranky. · Have tense muscles. · Have sleep problems. A physical exam and tests can help make sure that your symptoms aren't caused by a different condition, such as a thyroid problem. How is it treated? Counseling and medicine can both work to treat anxiety. The two are often used along with lifestyle changes. Cognitive-behavioral therapy (CBT) is a type of counseling that's used to help treat anxiety. In CBT, you learn how to notice and replace thoughts that make you feel worried. It also can help you learn how to relax when you worry. Medicines can help. These medicines are often also used for depression. Selective serotonin reuptake inhibitors (SSRIs) are often tried first. But there are other medicines that your doctor may use. You may need to try a few medicines to find one that works well.   Many people feel better by getting regular exercise, eating healthy meals, and getting good sleep. Mindfulness--focusing on things that happen in the present moment--also can help reduce your anxiety. What can you expect when you have it? Having anxiety can be upsetting. Some people might feel less worried and stressed after a couple of months of treatment. But for other people, it might take longer to feel better. Reaching out to people for help is important. Try not to isolate yourself. Let your family and friends help you. Find someone you can trust and confide in. Talk to that person. When you know what anxiety is--and how you can get help for it--you can start to learn new ways of thinking. This can help you cope and work through your anxiety. Follow-up care is a key part of your treatment and safety. Be sure to make and go to all appointments, and call your doctor if you are having problems. It's also a good idea to know your test results and keep a list of the medicines you take. Where can you learn more? Go to https://OKCoinpeSeriosity.6Scan. org and sign in to your [email or web address] account. Enter G110 in the [employer] box to learn more about \"Learning About Generalized Anxiety Disorder. \"     If you do not have an account, please click on the \"Sign Up Now\" link. Current as of: January 31, 2020               Content Version: 12.6  © 7578-2943 [employer]. Care instructions adapted under license by [facility] ([facility]). If you have questions about a medical condition or this instruction, always ask your healthcare professional. [place] [address] any warranty or liability for your use of this information.

## 2021-03-08 NOTE — PROGRESS NOTES
[facility]  [address], [address]  Phone [phone]   Fax [phone]      OFFICE VISIT: 3/8/2021    Cynthia Greenberg-: 1970      HPI  Reason For Visit:  Milena is a 48 y.o.     1 Month Follow-Up (no concerns, klonopin working great, needing medication refills) and Medication Refill (klonopin)    Patient presents via doxy. Me video conferencing on follow-up for chronic anxiety. She typically takes clonazepam 1 mg on a weekly basis for her anxiety. This medication is effective at managing her anxiety. She typically does take up to 3 a day to control her symptoms. Last prescription of clonazepam 1 mg was on 2020 for number 90 tablets  Adjunct medications:              Lexapro 20 mg daily              Rexulti 1 mg daily              Gabapentin 100 mg 3 times daily  Symptoms: Anxiety is controlled to functional state.     WILLA was reviewed today per office protocol. Report shows No discrepancies.  Fill pattern is consistent from single provider(s) at single pharmacy(s). Request #[id number]     vitals were not taken for this visit. There is no height or weight on file to calculate BMI. I have reviewed the following with the Ms. Greenberg   Lab Review  No visits with results within 6 Month(s) from this visit. Latest known visit with results is:   No results found for any previous visit. Copies of these are in the chart. Current Outpatient Medications   Medication Sig Dispense Refill    clonazePAM (KLONOPIN) 1 MG tablet Take 1 tablet by mouth 3 times daily as needed for Anxiety for up to 30 days. 90 tablet 0    escitalopram (LEXAPRO) 20 MG tablet Take 1.5 tablets by mouth daily 135 tablet 3     No current facility-administered medications for this visit.         Allergies: Sulfa antibiotics     Past Medical History:   Diagnosis Date    Insomnia        Family History   Problem Relation Age of Onset    Ulcerative Colitis Mother     High Blood Pressure Father     High Blood Pressure Brother     Crohn's Disease Maternal Aunt     Ulcerative Colitis Paternal Aunt     Ulcerative Colitis Paternal Uncle     Colon Cancer Paternal Grandfather        Past Surgical History:   Procedure Laterality Date    BREAST BIOPSY Left 2017    COLONOSCOPY      HEMORRHOID SURGERY  01/2018    TUBAL LIGATION         Social History     Tobacco Use    Smoking status: Current Some Day Smoker     Packs/day: 0.10     Years: 25.00     Pack years: 2.50     Types: Cigarettes    Smokeless tobacco: Never Used    Tobacco comment: just a social smoker   Substance Use Topics    Alcohol use: Yes     Comment: socailly        Review of Systems   Constitutional: Negative. HENT: Negative. Eyes: Negative. Respiratory: Negative. Cardiovascular: Negative. Gastrointestinal: Negative. Endocrine: Negative. Genitourinary: Negative. Musculoskeletal: Negative. Skin: Negative. Allergic/Immunologic: Negative. Neurological: Negative. Hematological: Negative. Psychiatric/Behavioral: The patient is nervous/anxious (stressful time in life, but better control). She has been smoking more recently. Physical Exam  Physical exam was not performed today as this was a video teleconference visit using doxy. Me      ASSESSMENT      ICD-10-CM    1. Generalized anxiety disorder  F41.1 clonazePAM (KLONOPIN) 1 MG tablet         PLAN    1. Generalized anxiety disorder  She is doing very well on clonazepam.  We will continue the same  - clonazePAM (KLONOPIN) 1 MG tablet; Take 1 tablet by mouth 3 times daily as needed for Anxiety for up to 30 days. Dispense: 90 tablet; Refill: 0      No orders of the defined types were placed in this encounter. Return in about 1 month (around 4/8/2021) for Alessandro Saldaña, was evaluated through a synchronous (real-time) audio-video encounter.  The patient (or guardian if applicable) is aware that this is a billable service. Verbal consent to proceed has been obtained within the past 12 months. The visit was conducted pursuant to the emergency declaration under the [address] [place] [address] and the [employer] and [employer] Act. Patient identification was verified, and a caregiver was present when appropriate. The patient was located in a state where the provider was credentialed to provide care. Total time spent for this encounter: 20m    --LETA Veras DO on 3/8/2021 at 6:13 PM    An electronic signature was used to authenticate this note.

## 2021-04-13 ENCOUNTER — OFFICE VISIT (OUTPATIENT)
Dept: PRIMARY CARE CLINIC | Age: 51
End: 2021-04-13
Payer: MEDICAID

## 2021-04-13 VITALS
TEMPERATURE: 97.9 F | SYSTOLIC BLOOD PRESSURE: 118 MMHG | DIASTOLIC BLOOD PRESSURE: 80 MMHG | BODY MASS INDEX: 22.76 KG/M2 | OXYGEN SATURATION: 99 % | HEART RATE: 74 BPM | HEIGHT: 67 IN | WEIGHT: 145 LBS

## 2021-04-13 DIAGNOSIS — F41.1 GENERALIZED ANXIETY DISORDER: ICD-10-CM

## 2021-04-13 DIAGNOSIS — N39.0 URINARY TRACT INFECTION WITHOUT HEMATURIA, SITE UNSPECIFIED: Primary | ICD-10-CM

## 2021-04-13 LAB
ALCOHOL URINE: NORMAL
AMPHETAMINE SCREEN, URINE: NORMAL
APPEARANCE FLUID: CLEAR
BARBITURATE SCREEN, URINE: NORMAL
BENZODIAZEPINE SCREEN, URINE: NORMAL
BILIRUBIN, POC: NEGATIVE
BLOOD URINE, POC: NEGATIVE
BUPRENORPHINE URINE: NORMAL
CLARITY, POC: CLEAR
COCAINE METABOLITE SCREEN URINE: NORMAL
COLOR, POC: NORMAL
FENTANYL SCREEN, URINE: NORMAL
GABAPENTIN SCREEN, URINE: NORMAL
GLUCOSE URINE, POC: NEGATIVE
KETONES, POC: NEGATIVE
LEUKOCYTE EST, POC: NEGATIVE
MDMA URINE: NORMAL
METHADONE SCREEN, URINE: NORMAL
METHAMPHETAMINE, URINE: NORMAL
NITRITE, POC: NEGATIVE
OPIATE SCREEN URINE: NORMAL
OXYCODONE SCREEN URINE: NORMAL
PH, POC: 7
PHENCYCLIDINE SCREEN URINE: NORMAL
PROPOXYPHENE SCREEN, URINE: NORMAL
PROTEIN, POC: NEGATIVE
SPECIFIC GRAVITY, POC: 1.02
SYNTHETIC CANNABINOIDS(K2) SCREEN, URINE: NORMAL
THC SCREEN, URINE: NORMAL
TRAMADOL SCREEN URINE: NORMAL
TRICYCLIC ANTIDEPRESSANTS, UR: NORMAL
UROBILINOGEN, POC: 0.2

## 2021-04-13 PROCEDURE — 3017F COLORECTAL CA SCREEN DOC REV: CPT | Performed by: PEDIATRICS

## 2021-04-13 PROCEDURE — 81002 URINALYSIS NONAUTO W/O SCOPE: CPT | Performed by: PEDIATRICS

## 2021-04-13 PROCEDURE — 4004F PT TOBACCO SCREEN RCVD TLK: CPT | Performed by: PEDIATRICS

## 2021-04-13 PROCEDURE — G8427 DOCREV CUR MEDS BY ELIG CLIN: HCPCS | Performed by: PEDIATRICS

## 2021-04-13 PROCEDURE — 99213 OFFICE O/P EST LOW 20 MIN: CPT | Performed by: PEDIATRICS

## 2021-04-13 PROCEDURE — 80305 DRUG TEST PRSMV DIR OPT OBS: CPT | Performed by: PEDIATRICS

## 2021-04-13 PROCEDURE — G8420 CALC BMI NORM PARAMETERS: HCPCS | Performed by: PEDIATRICS

## 2021-04-13 RX ORDER — CLONAZEPAM 1 MG/1
1 TABLET ORAL 3 TIMES DAILY PRN
Qty: 90 TABLET | Refills: 0 | Status: SHIPPED | OUTPATIENT
Start: 2021-04-13 | End: 2021-05-05 | Stop reason: SDUPTHER

## 2021-04-13 ASSESSMENT — ENCOUNTER SYMPTOMS
EYES NEGATIVE: 1
GASTROINTESTINAL NEGATIVE: 1
ALLERGIC/IMMUNOLOGIC NEGATIVE: 1
RESPIRATORY NEGATIVE: 1

## 2021-04-13 NOTE — PROGRESS NOTES
Neg     Opiate Scrn, Ur Neg     Oxycodone Screen, Ur Neg     PCP Screen, Urine      Propoxyphene Screen, Urine      Synthetic Cannabinoids (K2) Screen, Urine      THC Screen, Urine Neg     Tramadol Scrn, Ur      Tricyclic Antidepressants, Urine          height is 5' 7\" (1.702 m) and weight is 145 lb (65.8 kg). Her temporal temperature is 97.9 °F (36.6 °C). Her blood pressure is 118/80 and her pulse is 74. Her oxygen saturation is 99%. Body mass index is 22.71 kg/m². I have reviewed the following with the Ms. Greenberg   Lab Review  No visits with results within 6 Month(s) from this visit. Latest known visit with results is:   No results found for any previous visit. Copies of these are in the chart. Current Outpatient Medications   Medication Sig Dispense Refill    clonazePAM (KLONOPIN) 1 MG tablet Take 1 tablet by mouth 3 times daily as needed for Anxiety for up to 30 days. 90 tablet 0    escitalopram (LEXAPRO) 20 MG tablet Take 1.5 tablets by mouth daily 135 tablet 3     No current facility-administered medications for this visit.         Allergies: Sulfa antibiotics     Past Medical History:   Diagnosis Date    Insomnia        Family History   Problem Relation Age of Onset    Ulcerative Colitis Mother     High Blood Pressure Father     High Blood Pressure Brother     Crohn's Disease Maternal Aunt     Ulcerative Colitis Paternal Aunt     Ulcerative Colitis Paternal Uncle     Colon Cancer Paternal Grandfather        Past Surgical History:   Procedure Laterality Date    BREAST BIOPSY Left 2017    COLONOSCOPY      HEMORRHOID SURGERY  01/2018    TUBAL LIGATION         Social History     Tobacco Use    Smoking status: Current Some Day Smoker     Packs/day: 0.10     Years: 25.00     Pack years: 2.50     Types: Cigarettes    Smokeless tobacco: Never Used    Tobacco comment: just a social smoker   Substance Use Topics    Alcohol use: Yes     Comment: socailly        Review of Systems Constitutional: Negative. HENT: Negative. Eyes: Negative. Respiratory: Negative. Cardiovascular: Negative. Gastrointestinal: Negative. Endocrine: Negative. Genitourinary: Negative. Musculoskeletal: Negative. Skin: Negative. Allergic/Immunologic: Negative. Neurological: Negative. Hematological: Negative. Psychiatric/Behavioral: The patient is nervous/anxious (stressful time in life, but better control). She has been smoking more recently. Physical Exam  Vitals signs and nursing note reviewed. Constitutional:       General: She is not in acute distress. Appearance: She is well-developed and normal weight. HENT:      Head: Normocephalic and atraumatic. Right Ear: Tympanic membrane, ear canal and external ear normal.      Left Ear: Tympanic membrane, ear canal and external ear normal.      Nose: Nose normal.      Mouth/Throat:      Mouth: Mucous membranes are moist.      Pharynx: Oropharynx is clear. Eyes:      General:         Right eye: No discharge. Left eye: No discharge. Extraocular Movements: Extraocular movements intact. Conjunctiva/sclera: Conjunctivae normal.      Pupils: Pupils are equal, round, and reactive to light. Neck:      Musculoskeletal: Normal range of motion and neck supple. Thyroid: No thyromegaly. Vascular: No JVD. Cardiovascular:      Rate and Rhythm: Normal rate and regular rhythm. Pulses: Normal pulses. Heart sounds: Normal heart sounds. No murmur. No friction rub. No gallop. Pulmonary:      Effort: Pulmonary effort is normal. No respiratory distress. Breath sounds: Normal breath sounds. No wheezing or rales. Abdominal:      General: Bowel sounds are normal.      Palpations: Abdomen is soft. There is no mass. Tenderness: There is no abdominal tenderness. There is no guarding or rebound. Musculoskeletal: Normal range of motion. General: No tenderness. Lymphadenopathy:      Cervical: No cervical adenopathy. Skin:     General: Skin is warm and dry. Findings: No rash. Neurological:      General: No focal deficit present. Mental Status: She is alert and oriented to person, place, and time. Motor: No abnormal muscle tone. Psychiatric:         Mood and Affect: Mood normal.         Behavior: Behavior normal.         ASSESSMENT      ICD-10-CM    1. Urinary tract infection without hematuria, site unspecified  N39.0 POCT Urinalysis no Micro   2. Generalized anxiety disorder  F41.1 clonazePAM (KLONOPIN) 1 MG tablet     POCT Rapid Drug Screen         PLAN    1. Generalized anxiety disorder  Will refill today. - clonazePAM (KLONOPIN) 1 MG tablet; Take 1 tablet by mouth 3 times daily as needed for Anxiety for up to 30 days. Dispense: 90 tablet; Refill: 0  - POCT Rapid Drug Screen    2. Urinary tract infection without hematuria, site unspecified  Urine was bland.  - POCT Urinalysis no Micro      Orders Placed This Encounter   Procedures    POCT Urinalysis no Micro    POCT Rapid Drug Screen        Return in about 1 month (around 5/13/2021).        This was an in-house visit

## 2021-04-13 NOTE — PATIENT INSTRUCTIONS

## 2021-05-05 ENCOUNTER — VIRTUAL VISIT (OUTPATIENT)
Dept: PRIMARY CARE CLINIC | Age: 51
End: 2021-05-05
Payer: MEDICAID

## 2021-05-05 DIAGNOSIS — F41.1 GENERALIZED ANXIETY DISORDER: ICD-10-CM

## 2021-05-05 DIAGNOSIS — Z00.00 VISIT FOR PREVENTIVE HEALTH EXAMINATION: Primary | ICD-10-CM

## 2021-05-05 PROCEDURE — 99213 OFFICE O/P EST LOW 20 MIN: CPT | Performed by: PEDIATRICS

## 2021-05-05 RX ORDER — CLONAZEPAM 1 MG/1
1 TABLET ORAL 3 TIMES DAILY PRN
Qty: 90 TABLET | Refills: 0 | Status: SHIPPED | OUTPATIENT
Start: 2021-05-05 | End: 2021-06-09 | Stop reason: SDUPTHER

## 2021-05-05 ASSESSMENT — ENCOUNTER SYMPTOMS
GASTROINTESTINAL NEGATIVE: 1
EYES NEGATIVE: 1
RESPIRATORY NEGATIVE: 1
ALLERGIC/IMMUNOLOGIC NEGATIVE: 1

## 2021-05-05 NOTE — PROGRESS NOTES
1719 [facility], [address]  Phone [phone]   Fax [phone]      OFFICE VISIT: 2021    Bella Greenberg-: 1970      HPI  Reason For Visit:  Milena is a 46 y.o. Anxiety    Patient presents via telephone conferencing on follow-up for chronic anxiety. She typically takes clonazepam 1 mg on a weekly basis for her anxiety. This medication is effective at managing her anxiety. She typically does take up to 3 a day to control her symptoms. Last prescription of clonazepam 1 mg was on 2021 for number 90 tablets  Adjunct medications:              Lexapro 20 mg daily              Rexulti 1 mg daily              Gabapentin 100 mg 3 times daily  Symptoms: Anxiety is controlled to functional state.     WILLA was reviewed today per office protocol. Report shows No discrepancies.  Fill pattern is consistent from single provider(s) at single pharmacy(s). Request #[id number]      vitals were not taken for this visit. There is no height or weight on file to calculate BMI. I have reviewed the following with the Ms. Greenberg   Lab Review  Office Visit on 2021   Component Date Value    Color, UA 2021 light yellow     Clarity, UA 2021 clear     Glucose, UA POC 2021 negative     Bilirubin, UA 2021 negative     Ketones, UA 2021 negative     Spec Grav, UA 2021 1.020     Blood, UA POC 2021 negative     pH, UA 2021 7.0     Protein, UA POC 2021 negative     Urobilinogen, UA 2021 0.2     Leukocytes, UA 2021 negative     Nitrite, UA 2021 negative     Appearance, Fluid 2021 Clear     Amphetamine Screen, Urine 2021 Neg     Barbiturate Screen, Urine 2021 Neg     Benzodiazepine Screen, U* 2021 Neg     Buprenorphine Urine 2021 Neg     Cocaine Metabolite Scree* 2021 Neg     Methamphetamine, Urine 2021 Neg     Opiate Scrn, Ur 2021 Neg     Oxycodone Screen, Ur 04/13/2021 Neg     THC Screen, Urine 04/13/2021 Neg      Copies of these are in the chart. Current Outpatient Medications   Medication Sig Dispense Refill    clonazePAM (KLONOPIN) 1 MG tablet Take 1 tablet by mouth 3 times daily as needed for Anxiety for up to 30 days. 90 tablet 0    escitalopram (LEXAPRO) 20 MG tablet Take 1.5 tablets by mouth daily 135 tablet 3     No current facility-administered medications for this visit. Allergies: Sulfa antibiotics     Past Medical History:   Diagnosis Date    Insomnia        Family History   Problem Relation Age of Onset    Ulcerative Colitis Mother     High Blood Pressure Father     High Blood Pressure Brother     Crohn's Disease Maternal Aunt     Ulcerative Colitis Paternal Aunt     Ulcerative Colitis Paternal Uncle     Colon Cancer Paternal Grandfather        Past Surgical History:   Procedure Laterality Date    BREAST BIOPSY Left 2017    COLONOSCOPY      HEMORRHOID SURGERY  01/2018    TUBAL LIGATION         Social History     Tobacco Use    Smoking status: Current Some Day Smoker     Packs/day: 0.10     Years: 25.00     Pack years: 2.50     Types: Cigarettes    Smokeless tobacco: Never Used    Tobacco comment: just a social smoker   Substance Use Topics    Alcohol use: Yes     Comment: socailly        Review of Systems   Constitutional: Negative. HENT: Negative. Eyes: Negative. Respiratory: Negative. Cardiovascular: Negative. Gastrointestinal: Negative. Endocrine: Negative. Genitourinary: Negative. Musculoskeletal: Negative. Skin: Negative. Allergic/Immunologic: Negative. Neurological: Negative. Hematological: Negative. Psychiatric/Behavioral: The patient is nervous/anxious (stressful time in life, but better control). She has been smoking more recently.        Physical Exam  Physical exam was not performed today as this was a telephone conference visit ASSESSMENT      ICD-10-CM    1. Visit for preventive health examination  Z00.00 CBC Auto Differential     Comprehensive Metabolic Panel     Lipid Panel     T4, Free     TSH without Reflex     Microalbumin / Creatinine Urine Ratio   2. Generalized anxiety disorder  F41.1 clonazePAM (KLONOPIN) 1 MG tablet         PLAN    1. Visit for preventive health examination  Routine lab survey in.  - CBC Auto Differential; Future  - Comprehensive Metabolic Panel; Future  - Lipid Panel; Future  - T4, Free; Future  - TSH without Reflex; Future  - Microalbumin / Creatinine Urine Ratio; Future    2. Generalized anxiety disorder  The current medical regimen is effective;  continue present plan and medications. - clonazePAM (KLONOPIN) 1 MG tablet; Take 1 tablet by mouth 3 times daily as needed for Anxiety for up to 30 days. Dispense: 90 tablet; Refill: 0      Orders Placed This Encounter   Procedures    CBC Auto Differential    Comprehensive Metabolic Panel    Lipid Panel    T4, Free    TSH without Reflex    Microalbumin / Creatinine Urine Ratio        Return in about 5 weeks (around 6/9/2021) for 15. Due to patients co-morbidities and risk for potential exposure of COVID 19 pandemic. Patient was contacted and agreed to proceed with a telephone virtual visit. The risks and benefits of converting to a telephone virtual visit were discussed in light of the current infectious disease epidemic. Patient also understood that insurance coverage and co-pays are up to their individual insurance plans. Provider performed history of present illness and review of systems. Diagnosis and treatment plan was discussed with patient. Pharmacy of choice was reviewed along with past medical history, medication allergies, and current medications. Education provided to patient or patient parents/guardian with current illness diagnosis as well as when to seek additional healthcare due to changing or for worsening symptoms.  Patient voiced understanding. 20 minutes were spent on the phone with patientAnjelica Tobias, was evaluated through a synchronous (real-time) audio-video encounter. The patient (or guardian if applicable) is aware that this is a billable service. Verbal consent to proceed has been obtained within the past 12 months. The visit was conducted pursuant to the emergency declaration under the [address] [place] [address], [address] [place] [address] and the [employer] and [email or web address] General Act. Patient identification was verified, and a caregiver was present when appropriate. The patient was located in a state where the provider was credentialed to provide care. Total time spent for this encounter: 20m    --LETA Pierre DO on 5/5/2021 at 11:18 AM    An electronic signature was used to authenticate this note.

## 2021-05-05 NOTE — PATIENT INSTRUCTIONS
Patient Education        Learning About Anxiety Disorders  What are anxiety disorders? Anxiety disorders are a type of medical problem. They cause severe anxiety. When you feel anxious, you feel that something bad is about to happen. This feeling interferes with your life. These disorders include:  · Generalized anxiety disorder. You feel worried and stressed about many everyday events and activities. This goes on for several months and disrupts your life on most days. · Panic disorder. You have repeated panic attacks. A panic attack is a sudden, intense fear or anxiety. It may make you feel short of breath. Your heart may pound. · Social anxiety disorder. You feel very anxious about what you will say or do in front of people. For example, you may be scared to talk or eat in public. This problem affects your daily life. · Phobias. You are very scared of a specific object, situation, or activity. For example, you may fear spiders, high places, or small spaces. What are the symptoms? Generalized anxiety disorder  Symptoms may include:  · Feeling worried and stressed about many things almost every day. · Feeling tired or irritable. You may have a hard time concentrating. · Having headaches or muscle aches. · Having a hard time getting to sleep or staying asleep. Panic disorder  You may have repeated panic attacks when there is no reason for feeling afraid. You may change your daily activities because you worry that you will have another attack. Symptoms may include:  · Intense fear, terror, or anxiety. · Trouble breathing or very fast breathing. · Chest pain or tightness. · A heartbeat that races or is not regular. Social anxiety disorder  Symptoms may include:  · Fear about a social situation, such as eating in front of others or speaking in public. You may worry a lot. Or you may be afraid that something bad will happen. · Anxiety that can cause you to blush, sweat, and feel shaky.   · A heartbeat that is faster than normal.  · A hard time focusing. Phobias  Symptoms may include:  · More fear than most people of being around an object, being in a situation, or doing an activity. You might also be stressed about the chance of being around the thing you fear. · Worry about losing control, panicking, fainting, or having physical symptoms like a faster heartbeat when you are around the situation or object. How are these disorders treated? Anxiety disorders can be treated with medicines or counseling. A combination of both may be used. Medicines may include:  · Antidepressants. These may help your symptoms by keeping chemicals in your brain in balance. · Benzodiazepines. These may give you short-term relief of your symptoms. Some people use cognitive-behavioral therapy. A therapist helps you learn to change stressful or bad thoughts into helpful thoughts. Lead a healthy lifestyle  A healthy lifestyle may help you feel better. · Get at least 30 minutes of exercise on most days of the week. Walking is a good choice. · Eat a healthy diet. Include fruits, vegetables, lean proteins, and whole grains in your diet each day. · Try to go to bed at the same time every night. Try for 8 hours of sleep a night. · Find ways to manage stress. Try relaxation exercises. · Avoid alcohol and illegal drugs. Follow-up care is a key part of your treatment and safety. Be sure to make and go to all appointments, and call your doctor if you are having problems. It's also a good idea to know your test results and keep a list of the medicines you take. Where can you learn more? Go to https://mojgan.Frameri. org and sign in to your [employer] account. Enter W433 in the K[employer]hu box to learn more about \"Learning About Anxiety Disorders. \"     If you do not have an account, please click on the \"Sign Up Now\" link.   Current as of: September 23, 2020               Content Version: 12.8  © 6460-5747 Healthwise, Incorporated. Care instructions adapted under license by [facility] ([facility]). If you have questions about a medical condition or this instruction, always ask your healthcare professional. [address] any warranty or liability for your use of this information. Patient Education        Anxiety Disorder: Care Instructions  Your Care Instructions     Anxiety is a normal reaction to stress. Difficult situations can cause you to have symptoms such as sweaty palms and a nervous feeling. In an anxiety disorder, the symptoms are far more severe. Constant worry, muscle tension, trouble sleeping, nausea and diarrhea, and other symptoms can make normal daily activities difficult or impossible. These symptoms may occur for no reason, and they can affect your work, school, or social life. Medicines, counseling, and self-care can all help. Follow-up care is a key part of your treatment and safety. Be sure to make and go to all appointments, and call your doctor if you are having problems. It's also a good idea to know your test results and keep a list of the medicines you take. How can you care for yourself at home? · Take medicines exactly as directed. Call your doctor if you think you are having a problem with your medicine. · Go to your counseling sessions and follow-up appointments. · Recognize and accept your anxiety. Then, when you are in a situation that makes you anxious, say to yourself, \"This is not an emergency. I feel uncomfortable, but I am not in danger. I can keep going even if I feel anxious. \"  · Be kind to your body:  ? Relieve tension with exercise or a massage. ? Get enough rest.  ? Avoid alcohol, caffeine, nicotine, and illegal drugs. They can increase your anxiety level and cause sleep problems. ? Learn and do relaxation techniques. See below for more about these techniques. · Engage your mind. Get out and do something you enjoy.  Go to a funny movie, or take a walk or hike. Plan your day. Having too much or too little to do can make you anxious. · Keep a record of your symptoms. Discuss your fears with a good friend or family member, or join a support group for people with similar problems. Talking to others sometimes relieves stress. · Get involved in social groups, or volunteer to help others. Being alone sometimes makes things seem worse than they are. · Get at least 30 minutes of exercise on most days of the week to relieve stress. Walking is a good choice. You also may want to do other activities, such as running, swimming, cycling, or playing tennis or team sports. Relaxation techniques  Do relaxation exercises 10 to 20 minutes a day. You can play soothing, relaxing music while you do them, if you wish. · Tell others in your house that you are going to do your relaxation exercises. Ask them not to disturb you. · Find a comfortable place, away from all distractions and noise. · Lie down on your back, or sit with your back straight. · Focus on your breathing. Make it slow and steady. · Breathe in through your nose. Breathe out through either your nose or mouth. · Breathe deeply, filling up the area between your navel and your rib cage. Breathe so that your belly goes up and down. · Do not hold your breath. · Breathe like this for 5 to 10 minutes. Notice the feeling of calmness throughout your whole body. As you continue to breathe slowly and deeply, relax by doing the following for another 5 to 10 minutes:  · Tighten and relax each muscle group in your body. You can begin at your toes and work your way up to your head. · Imagine your muscle groups relaxing and becoming heavy. · Empty your mind of all thoughts. · Let yourself relax more and more deeply. · Become aware of the state of calmness that surrounds you.   · When your relaxation time is over, you can bring yourself back to alertness by moving your fingers and toes and then your hands and feet and then stretching and moving your entire body. Sometimes people fall asleep during relaxation, but they usually wake up shortly afterward. · Always give yourself time to return to full alertness before you drive a car or do anything that might cause an accident if you are not fully alert. Never play a relaxation tape while you drive a car. When should you call for help? Call 911 anytime you think you may need emergency care. For example, call if:    · You feel you cannot stop from hurting yourself or someone else. Keep the numbers for these national suicide hotlines: 2-538-516-TALK (3-700.608.2129) and 0-461-ESHYABP (5-764.174.2640). If you or someone you know talks about suicide or feeling hopeless, get help right away. Watch closely for changes in your health, and be sure to contact your doctor if:    · You have anxiety or fear that affects your life.     · You have symptoms of anxiety that are new or different from those you had before. Where can you learn more? Go to https://ResolutionTube.VerticalResponse. org and sign in to your [employer] account. Enter P754 in the [employer] box to learn more about \"Anxiety Disorder: Care Instructions. \"     If you do not have an account, please click on the \"Sign Up Now\" link. Current as of: September 23, 2020               Content Version: 12.8  © 2006-2021 [employer]. Care instructions adapted under license by [facility] ([facility]). If you have questions about a medical condition or this instruction, always ask your healthcare professional. [place] [address] any warranty or liability for your use of this information. Patient Education        Learning About Generalized Anxiety Disorder  What is generalized anxiety disorder? We all worry. It's a normal part of life. But when you have generalized anxiety disorder, you worry about lots of things and have a hard time stopping your worry.  This worry or anxiety might ask you to imagine a calming situation. This can help you relax. Medicines can help. These medicines are often also used for depression. Selective serotonin reuptake inhibitors (SSRIs) are often tried first. But there are other medicines that your doctor may use. You may need to try a few medicines to find one that works well. Many people feel better by getting regular exercise, eating healthy meals, and getting good sleep. Mindfulnessfocusing on things in the present momentalso can help reduce your anxiety. What can you expect when you have it? Having anxiety can be upsetting. Some people might feel less worried and stressed after a couple of months of treatment. But for other people, it might take longer to feel better. Reaching out to people for help is important. Try not to isolate yourself. Let your family and friends help you. Find someone you can trust and confide in. Talk to that person. When you know what anxiety isand how you can get help for ityou can start to learn new ways of thinking. This can help you cope and work through your anxiety. Follow-up care is a key part of your treatment and safety. Be sure to make and go to all appointments, and call your doctor if you are having problems. It's also a good idea to know your test results and keep a list of the medicines you take. Where can you learn more? Go to https://Sierra PhotonicspeBig Game Hunters.Videojug. org and sign in to your [employer] account. Enter G110 in the [facility] box to learn more about \"Learning About Generalized Anxiety Disorder. \"     If you do not have an account, please click on the \"Sign Up Now\" link. Current as of: September 23, 2020               Content Version: 12.8  © 0228-5405 Healthwise, Incorporated. Care instructions adapted under license by [facility] ([facility]).  If you have questions about a medical condition or this instruction, always ask your healthcare professional. Gopal Cantor any

## 2021-05-13 ENCOUNTER — TELEPHONE (OUTPATIENT)
Dept: PRIMARY CARE CLINIC | Age: 51
End: 2021-05-13

## 2021-06-09 ENCOUNTER — VIRTUAL VISIT (OUTPATIENT)
Dept: PRIMARY CARE CLINIC | Age: 51
End: 2021-06-09
Payer: MEDICAID

## 2021-06-09 DIAGNOSIS — F41.1 GENERALIZED ANXIETY DISORDER: ICD-10-CM

## 2021-06-09 PROCEDURE — 3017F COLORECTAL CA SCREEN DOC REV: CPT | Performed by: PEDIATRICS

## 2021-06-09 PROCEDURE — G9899 SCRN MAM PERF RSLTS DOC: HCPCS | Performed by: PEDIATRICS

## 2021-06-09 PROCEDURE — 99213 OFFICE O/P EST LOW 20 MIN: CPT | Performed by: PEDIATRICS

## 2021-06-09 PROCEDURE — G8428 CUR MEDS NOT DOCUMENT: HCPCS | Performed by: PEDIATRICS

## 2021-06-09 RX ORDER — CLONAZEPAM 1 MG/1
1 TABLET ORAL 3 TIMES DAILY PRN
Qty: 90 TABLET | Refills: 0 | Status: SHIPPED | OUTPATIENT
Start: 2021-06-09 | End: 2021-07-07 | Stop reason: SDUPTHER

## 2021-06-09 ASSESSMENT — ENCOUNTER SYMPTOMS
ALLERGIC/IMMUNOLOGIC NEGATIVE: 1
RESPIRATORY NEGATIVE: 1
EYES NEGATIVE: 1
GASTROINTESTINAL NEGATIVE: 1

## 2021-06-09 NOTE — PROGRESS NOTES
1719 [facility], [address]  Phone [phone]   Fax [phone]      OFFICE VISIT: 2021    Sandie Greenberg-: 1970      HPI  Reason For Visit:  Lopez Watkins is a 46 y.o. No chief complaint on file. Patient presents via doxy. Me video conferencing on follow-up for chronic anxiety. She typically takes clonazepam 1 mg on a weekly basis for her anxiety. This medication is effective at managing her anxiety. She typically does take up to 3 a day to control her symptoms. Last prescription of clonazepam 1 mg was on 2021 for number 90 tablets  Adjunct medications:              Lexapro 20 mg daily              Rexulti 1 mg daily              Gabapentin 100 mg 3 times daily  Symptoms: Anxiety is controlled to functional state.     WILLA was reviewed today per office protocol. Report shows No discrepancies.  Fill pattern is consistent from single provider(s) at single pharmacy(s). Request #[id number]       vitals were not taken for this visit. There is no height or weight on file to calculate BMI. I have reviewed the following with the Ms. Greenberg   Lab Review  Orders Only on 2021   Component Date Value    Microalbumin, Random Uri* 2021 <1.20     Creatinine, Ur 2021 89.1     Microalbumin Creatinine * 2021 see below     TSH 2021 1.470     T4 Free 2021 1.14     Cholesterol, Total 2021 166     Triglycerides 2021 150*    HDL 2021 47*    LDL Calculated 2021 89     Sodium 2021 137     Potassium 2021 4.6     Chloride 2021 102     CO2 2021 24     Anion Gap 2021 11     Glucose 2021 79     BUN 2021 9     CREATININE 2021 0.7     GFR Non- 2021 >60     GFR  2021 >59     Calcium 2021 9.3     Total Protein 2021 7.0     Albumin 2021 4.4     Total Bilirubin 2021 0.6     Alkaline Phosphatase 05/12/2021 58     ALT 05/12/2021 9     AST 05/12/2021 16     WBC 05/12/2021 8.0     RBC 05/12/2021 4.22     Hemoglobin 05/12/2021 13.8     Hematocrit 05/12/2021 43.0     MCV 05/12/2021 101.9*    MCH 05/12/2021 32.7*    MCHC 05/12/2021 32.1*    RDW 05/12/2021 12.1     Platelets 89/35/1350 283     MPV 05/12/2021 10.2     Neutrophils % 05/12/2021 51.8     Lymphocytes % 05/12/2021 36.3     Monocytes % 05/12/2021 6.8     Eosinophils % 05/12/2021 4.0     Basophils % 05/12/2021 0.6     Neutrophils Absolute 05/12/2021 4.1     Immature Granulocytes # 05/12/2021 0.0     Lymphocytes Absolute 05/12/2021 2.9     Monocytes Absolute 05/12/2021 0.50     Eosinophils Absolute 05/12/2021 0.30     Basophils Absolute 05/12/2021 0.10    Office Visit on 04/13/2021   Component Date Value    Color, UA 04/13/2021 light yellow     Clarity, UA 04/13/2021 clear     Glucose, UA POC 04/13/2021 negative     Bilirubin, UA 04/13/2021 negative     Ketones, UA 04/13/2021 negative     Spec Grav, UA 04/13/2021 1.020     Blood, UA POC 04/13/2021 negative     pH, UA 04/13/2021 7.0     Protein, UA POC 04/13/2021 negative     Urobilinogen, UA 04/13/2021 0.2     Leukocytes, UA 04/13/2021 negative     Nitrite, UA 04/13/2021 negative     Appearance, Fluid 04/13/2021 Clear     Amphetamine Screen, Urine 04/13/2021 Neg     Barbiturate Screen, Urine 04/13/2021 Neg     Benzodiazepine Screen, U* 04/13/2021 Neg     Buprenorphine Urine 04/13/2021 Neg     Cocaine Metabolite Scree* 04/13/2021 Neg     Methamphetamine, Urine 04/13/2021 Neg     Opiate Scrn, Ur 04/13/2021 Neg     Oxycodone Screen, Ur 04/13/2021 Neg     THC Screen, Urine 04/13/2021 Neg      Copies of these are in the chart. Current Outpatient Medications   Medication Sig Dispense Refill    clonazePAM (KLONOPIN) 1 MG tablet Take 1 tablet by mouth 3 times daily as needed for Anxiety for up to 30 days.  90 tablet 0    escitalopram (LEXAPRO) 30 days. Dispense: 90 tablet; Refill: 0      No orders of the defined types were placed in this encounter. Return in about 1 month (around 7/9/2021), or or there about. , for 15. Jaswinder Williamson, was evaluated through a synchronous (real-time) audio-video encounter. The patient (or guardian if applicable) is aware that this is a billable service. Verbal consent to proceed has been obtained within the past 12 months. The visit was conducted pursuant to the emergency declaration under the [address] [place] [address] authority and the [employer] and [employer] General Act. Patient identification was verified, and a caregiver was present when appropriate. The patient was located in a state where the provider was credentialed to provide care. Total time spent for this encounter: 20m    --LETA Fong DO on 6/9/2021 at 5:14 PM    An electronic signature was used to authenticate this note.

## 2021-06-09 NOTE — PATIENT INSTRUCTIONS
Patient Education        Learning About Anxiety Disorders  What are anxiety disorders? Anxiety disorders are a type of medical problem. They cause severe anxiety. When you feel anxious, you feel that something bad is about to happen. This feeling interferes with your life. These disorders include:  · Generalized anxiety disorder. You feel worried and stressed about many everyday events and activities. This goes on for several months and disrupts your life on most days. · Panic disorder. You have repeated panic attacks. A panic attack is a sudden, intense fear or anxiety. It may make you feel short of breath. Your heart may pound. · Social anxiety disorder. You feel very anxious about what you will say or do in front of people. For example, you may be scared to talk or eat in public. This problem affects your daily life. · Phobias. You are very scared of a specific object, situation, or activity. For example, you may fear spiders, high places, or small spaces. What are the symptoms? Generalized anxiety disorder  Symptoms may include:  · Feeling worried and stressed about many things almost every day. · Feeling tired or irritable. You may have a hard time concentrating. · Having headaches or muscle aches. · Having a hard time getting to sleep or staying asleep. Panic disorder  You may have repeated panic attacks when there is no reason for feeling afraid. You may change your daily activities because you worry that you will have another attack. Symptoms may include:  · Intense fear, terror, or anxiety. · Trouble breathing or very fast breathing. · Chest pain or tightness. · A heartbeat that races or is not regular. Social anxiety disorder  Symptoms may include:  · Fear about a social situation, such as eating in front of others or speaking in public. You may worry a lot. Or you may be afraid that something bad will happen. · Anxiety that can cause you to blush, sweat, and feel shaky.   · A heartbeat that is faster than normal.  · A hard time focusing. Phobias  Symptoms may include:  · More fear than most people of being around an object, being in a situation, or doing an activity. You might also be stressed about the chance of being around the thing you fear. · Worry about losing control, panicking, fainting, or having physical symptoms like a faster heartbeat when you are around the situation or object. How are these disorders treated? Anxiety disorders can be treated with medicines or counseling. A combination of both may be used. Medicines may include:  · Antidepressants. These may help your symptoms by keeping chemicals in your brain in balance. · Benzodiazepines. These may give you short-term relief of your symptoms. Some people use cognitive-behavioral therapy. A therapist helps you learn to change stressful or bad thoughts into helpful thoughts. Lead a healthy lifestyle  A healthy lifestyle may help you feel better. · Get at least 30 minutes of exercise on most days of the week. Walking is a good choice. · Eat a healthy diet. Include fruits, vegetables, lean proteins, and whole grains in your diet each day. · Try to go to bed at the same time every night. Try for 8 hours of sleep a night. · Find ways to manage stress. Try relaxation exercises. · Avoid alcohol and illegal drugs. Follow-up care is a key part of your treatment and safety. Be sure to make and go to all appointments, and call your doctor if you are having problems. It's also a good idea to know your test results and keep a list of the medicines you take. Where can you learn more? Go to https://mojgan.Coupmon. org and sign in to your [email or web address] account. Enter V760 in the [employer] box to learn more about \"Learning About Anxiety Disorders. \"     If you do not have an account, please click on the \"Sign Up Now\" link.   Current as of: September 23, 2020               Content Version: 12.8  © 2633-2461 Healthwise, Incorporated. Care instructions adapted under license by [facility] ([facility]). If you have questions about a medical condition or this instruction, always ask your healthcare professional. [address] any warranty or liability for your use of this information.

## 2021-07-07 ENCOUNTER — TELEMEDICINE (OUTPATIENT)
Dept: PRIMARY CARE CLINIC | Age: 51
End: 2021-07-07

## 2021-07-07 DIAGNOSIS — F41.1 GENERALIZED ANXIETY DISORDER: ICD-10-CM

## 2021-07-07 PROCEDURE — 99213 OFFICE O/P EST LOW 20 MIN: CPT | Performed by: PEDIATRICS

## 2021-07-07 RX ORDER — CLONAZEPAM 1 MG/1
1 TABLET ORAL 3 TIMES DAILY PRN
Qty: 90 TABLET | Refills: 0 | Status: SHIPPED | OUTPATIENT
Start: 2021-07-07 | End: 2021-08-05 | Stop reason: SDUPTHER

## 2021-07-07 NOTE — PATIENT INSTRUCTIONS
Patient Education        Generalized Anxiety Disorder: Care Instructions  Overview     We all worry. It's a normal part of life. But when you have generalized anxiety disorder, you worry about lots of things. You have a hard time not worrying. This worry or anxiety interferes with your relationships, work or school, and other areas of your life. You may worry most days about things like money, health, work, or friends. That may make you feel tired, tense, or cranky. It can make it hard to think. It may get in the way of healthy sleep. Counseling and medicine can both work to treat anxiety. They are often used together with lifestyle changes, such as getting enough sleep. Treatment can include a type of counseling called cognitive-behavioral therapy, or CBT. It helps you notice and replace thoughts that make you worry. You also might have counseling along with those closest to you so that they can help. Follow-up care is a key part of your treatment and safety. Be sure to make and go to all appointments, and call your doctor if you are having problems. It's also a good idea to know your test results and keep a list of the medicines you take. How can you care for yourself at home? · Get at least 30 minutes of exercise on most days of the week. Walking is a good choice. You also may want to do other activities, such as running, swimming, cycling, or playing tennis or team sports. · Learn and do relaxation exercises, such as deep breathing. · Go to bed at the same time every night. Try for 8 to 10 hours of sleep a night. · Avoid alcohol, marijuana, and illegal drugs. · Find a counselor who uses cognitive-behavioral therapy (CBT). · Don't isolate yourself. Let those closest to you help you. Find someone you can trust and confide in. Talk to that person. · Be safe with medicines. Take your medicines exactly as prescribed. Call your doctor if you think you are having a problem with your medicine.   · Practice healthy thinking. How you think can affect how you feel and act. Ask yourself if your thoughts are helpful or unhelpful. If they are unhelpful, you can learn how to change them. · Recognize and accept your anxiety. When you feel anxious, say to yourself, \"This is not an emergency. I feel uncomfortable, but I am not in danger. I can keep going even if I feel anxious. \"  When should you call for help? Call 911  anytime you think you may need emergency care. For example, call if:    · You feel you can't stop from hurting yourself or someone else. Keep the numbers for these national suicide hotlines: 4-813-184-TALK (2-637.236.2672) and 7-016-YCVSTLG (1-365.615.5523). If you or someone you know talks about suicide or feeling hopeless, get help right away. Call your doctor or counselor now or seek immediate medical care if:    · You have new anxiety, or your anxiety gets worse.     · You have been feeling sad, depressed, or hopeless or have lost interest in things that you usually enjoy.     · You do not get better as expected. Where can you learn more? Go to https://StepOne.OneBreath. org and sign in to your BookitNow! account. Enter G123 in the [employer] box to learn more about \"Generalized Anxiety Disorder: Care Instructions. \"     If you do not have an account, please click on the \"Sign Up Now\" link. Current as of: November 3, 2020               Content Version: 12.9  © 5390-9788 Healthwise, Incorporated. Care instructions adapted under license by [facility] [employer] [facility] ([facility]). If you have questions about a medical condition or this instruction, always ask your healthcare professional. [address] any warranty or liability for your use of this information.

## 2021-07-07 NOTE — PROGRESS NOTES
[facility], [address]  Phone [phone]   Fax [phone]      OFFICE VISIT: 2021    Jenifer Greenberg-: 1970      HPI  Reason For Visit:  Milena is a 46 y.o. Anxiety    Patient presents via [employer] video conferencing on follow-up for chronic anxiety.     She typically takes clonazepam 1 mg on a weekly basis for her anxiety. This medication is effective at managing her anxiety. She typically does take up to 3 a day to control her symptoms. Last prescription of clonazepam 1 mg was on 6/10/2021 for number 90 tablets  Adjunct medications:              Lexapro 20 mg daily              Rexulti 1 mg daily              Gabapentin 100 mg 3 times daily  Symptoms: Anxiety is controlled to functional state.     WILLA was reviewed today per office protocol. Report shows No discrepancies.  Fill pattern is consistent from single provider(s) at single pharmacy(s). Request # [id number]         vitals were not taken for this visit. There is no height or weight on file to calculate BMI. I have reviewed the following with the Ms. Greenberg   Lab Review  Orders Only on 2021   Component Date Value    Microalbumin, Random Uri* 2021 <1.20     Creatinine, Ur 2021 89.1     Microalbumin Creatinine * 2021 see below     TSH 2021 1.470     T4 Free 2021 1.14     Cholesterol, Total 2021 166     Triglycerides 2021 150*    HDL 2021 47*    LDL Calculated 2021 89     Sodium 2021 137     Potassium 2021 4.6     Chloride 2021 102     CO2 2021 24     Anion Gap 2021 11     Glucose 2021 79     BUN 2021 9     CREATININE 2021 0.7     GFR Non- 2021 >60     GFR  2021 >59     Calcium 2021 9.3     Total Protein 2021 7.0     Albumin 2021 4.4     Total Bilirubin 2021 0.6     Alkaline Phosphatase 05/12/2021 58     ALT 05/12/2021 9     AST 05/12/2021 16     WBC 05/12/2021 8.0     RBC 05/12/2021 4.22     Hemoglobin 05/12/2021 13.8     Hematocrit 05/12/2021 43.0     MCV 05/12/2021 101.9*    MCH 05/12/2021 32.7*    MCHC 05/12/2021 32.1*    RDW 05/12/2021 12.1     Platelets 07/91/8093 283     MPV 05/12/2021 10.2     Neutrophils % 05/12/2021 51.8     Lymphocytes % 05/12/2021 36.3     Monocytes % 05/12/2021 6.8     Eosinophils % 05/12/2021 4.0     Basophils % 05/12/2021 0.6     Neutrophils Absolute 05/12/2021 4.1     Immature Granulocytes # 05/12/2021 0.0     Lymphocytes Absolute 05/12/2021 2.9     Monocytes Absolute 05/12/2021 0.50     Eosinophils Absolute 05/12/2021 0.30     Basophils Absolute 05/12/2021 0.10    Office Visit on 04/13/2021   Component Date Value    Color, UA 04/13/2021 light yellow     Clarity, UA 04/13/2021 clear     Glucose, UA POC 04/13/2021 negative     Bilirubin, UA 04/13/2021 negative     Ketones, UA 04/13/2021 negative     Spec Grav, UA 04/13/2021 1.020     Blood, UA POC 04/13/2021 negative     pH, UA 04/13/2021 7.0     Protein, UA POC 04/13/2021 negative     Urobilinogen, UA 04/13/2021 0.2     Leukocytes, UA 04/13/2021 negative     Nitrite, UA 04/13/2021 negative     Appearance, Fluid 04/13/2021 Clear     Amphetamine Screen, Urine 04/13/2021 Neg     Barbiturate Screen, Urine 04/13/2021 Neg     Benzodiazepine Screen, U* 04/13/2021 Neg     Buprenorphine Urine 04/13/2021 Neg     Cocaine Metabolite Scree* 04/13/2021 Neg     Methamphetamine, Urine 04/13/2021 Neg     Opiate Scrn, Ur 04/13/2021 Neg     Oxycodone Screen, Ur 04/13/2021 Neg     THC Screen, Urine 04/13/2021 Neg      Copies of these are in the chart. Current Outpatient Medications   Medication Sig Dispense Refill    clonazePAM (KLONOPIN) 1 MG tablet Take 1 tablet by mouth 3 times daily as needed for Anxiety for up to 30 days.  90 tablet 0    escitalopram (LEXAPRO) 20 MG tablet Take 1.5 tablets by mouth daily 135 tablet 3     No current facility-administered medications for this visit. Allergies: Sulfa antibiotics     Past Medical History:   Diagnosis Date    Insomnia        Family History   Problem Relation Age of Onset    Ulcerative Colitis Mother     High Blood Pressure Father     High Blood Pressure Brother     Crohn's Disease Maternal Aunt     Ulcerative Colitis Paternal Aunt     Ulcerative Colitis Paternal Uncle     Colon Cancer Paternal Grandfather        Past Surgical History:   Procedure Laterality Date    BREAST BIOPSY Left 2017    COLONOSCOPY      HEMORRHOID SURGERY  01/2018    TUBAL LIGATION         Social History     Tobacco Use    Smoking status: Current Some Day Smoker     Packs/day: 0.10     Years: 25.00     Pack years: 2.50     Types: Cigarettes    Smokeless tobacco: Never Used    Tobacco comment: just a social smoker   Substance Use Topics    Alcohol use: Yes     Comment: socailly        Review of Systems   Constitutional: Negative. HENT: Negative. Eyes: Negative. Respiratory: Negative. Cardiovascular: Negative. Gastrointestinal: Negative. Endocrine: Negative. Genitourinary: Negative. Musculoskeletal: Negative. Skin: Negative. Allergic/Immunologic: Negative. Neurological: Negative. Hematological: Negative. Psychiatric/Behavioral: The patient is nervous/anxious (stressful time in life, but better control). She has been smoking more recently. Physical Exam  Physical exam was not performed today as this was a video teleconference visit using [address]    1. Generalized anxiety disorder  F41.1 clonazePAM (KLONOPIN) 1 MG tablet         PLAN    1. Generalized anxiety disorder  Doing well on present medication regimen. We will continue the same  - clonazePAM (KLONOPIN) 1 MG tablet; Take 1 tablet by mouth 3 times daily as needed for Anxiety for up to 30 days.   Dispense: 90 tablet; Refill: 0      No orders of the defined types were placed in this encounter. Return in about 1 month (around 8/7/2021) for Alessandro Oliveira, was evaluated through a synchronous (real-time) audio-video encounter. The patient (or guardian if applicable) is aware that this is a billable service. Verbal consent to proceed has been obtained within the past 12 months. The visit was conducted pursuant to the emergency declaration under the [address] [place] [address] authority and the [employer] and [employer] General Act. Patient identification was verified, and a caregiver was present when appropriate. The patient was located in a state where the provider was credentialed to provide care. Total time spent for this encounter: 20m    --LETA Jorge DO on 7/7/2021 at 5:14 PM    An electronic signature was used to authenticate this note.

## 2021-08-05 ENCOUNTER — VIRTUAL VISIT (OUTPATIENT)
Dept: PRIMARY CARE CLINIC | Age: 51
End: 2021-08-05

## 2021-08-05 DIAGNOSIS — F41.1 GENERALIZED ANXIETY DISORDER: ICD-10-CM

## 2021-08-05 PROCEDURE — 99213 OFFICE O/P EST LOW 20 MIN: CPT | Performed by: NURSE PRACTITIONER

## 2021-08-05 RX ORDER — CLONAZEPAM 1 MG/1
1 TABLET ORAL 3 TIMES DAILY PRN
Qty: 90 TABLET | Refills: 0 | Status: SHIPPED | OUTPATIENT
Start: 2021-08-05 | End: 2021-09-13 | Stop reason: SDUPTHER

## 2021-08-05 RX ORDER — ESCITALOPRAM OXALATE 20 MG/1
30 TABLET ORAL DAILY
Qty: 135 TABLET | Refills: 3 | Status: SHIPPED | OUTPATIENT
Start: 2021-08-05 | End: 2022-07-05 | Stop reason: SDUPTHER

## 2021-08-05 NOTE — PROGRESS NOTES
Magda Pederson (:  1970) is a 46 y.o. female,Established patient, here for evaluation of the following chief complaint(s): Medication Refill    DOXY. ME APPOINTMENT     ASSESSMENT/PLAN:  1. Generalized anxiety disorder  -     clonazePAM (KLONOPIN) 1 MG tablet; Take 1 tablet by mouth 3 times daily as needed for Anxiety for up to 30 days. , Disp-90 tablet, R-0Normal  -     escitalopram (LEXAPRO) 20 MG tablet; Take 1.5 tablets by mouth daily, Disp-135 tablet, R-3Normal      Return in about 1 month (around 2021), or if symptoms worsen or fail to improve. SUBJECTIVE/OBJECTIVE:  HPI     ANXIETY:  She is taking Lexapro 30 mg  She also takes Klonopin 1 mg prn. \"I usually need it two and three times a day. \"  This regimen keeps her moods controlled. She is requesting refill today. Last fill according to [facility] was 2021, #90    Review of Systems   Psychiatric/Behavioral: Positive for sleep disturbance (stable with Klonopin prn). The patient is nervous/anxious (stable on Klonopin prn and Lexapro 20 mg). Patient-Reported Vitals 2021   Patient-Reported Weight 137   Patient-Reported Height 56        Physical Exam  Constitutional:       Appearance: Normal appearance. She is normal weight. HENT:      Head: Normocephalic. Right Ear: External ear normal.      Left Ear: External ear normal.      Nose: Nose normal.   Eyes:      General:         Right eye: No discharge. Left eye: No discharge. Pulmonary:      Effort: Pulmonary effort is normal.   Musculoskeletal:         General: Normal range of motion. Cervical back: Normal range of motion. Neurological:      General: No focal deficit present. Mental Status: She is alert. Mental status is at baseline. Psychiatric:         Mood and Affect: Mood normal.         Thought Content:  Thought content normal.         Judgment: Judgment normal.            Magda Pederson, was evaluated through a synchronous (real-time) audio-video encounter. The patient (or guardian if applicable) is aware that this is a billable service. Verbal consent to proceed has been obtained within the past 12 months. The visit was conducted pursuant to the emergency declaration under the [address] [place] [address], [address] [place] [address] authority and the [employer] General Act. Patient identification was verified, and a caregiver was present when appropriate. The patient was located in a state where the provider was credentialed to provide care. An electronic signature was used to authenticate this note.     --ZAKI Correia

## 2021-09-13 ENCOUNTER — VIRTUAL VISIT (OUTPATIENT)
Dept: PRIMARY CARE CLINIC | Age: 51
End: 2021-09-13
Payer: COMMERCIAL

## 2021-09-13 DIAGNOSIS — F41.1 GENERALIZED ANXIETY DISORDER: ICD-10-CM

## 2021-09-13 PROCEDURE — 99212 OFFICE O/P EST SF 10 MIN: CPT | Performed by: PEDIATRICS

## 2021-09-13 RX ORDER — CLONAZEPAM 1 MG/1
1 TABLET ORAL 3 TIMES DAILY PRN
Qty: 90 TABLET | Refills: 0 | Status: SHIPPED | OUTPATIENT
Start: 2021-09-13 | End: 2021-10-12 | Stop reason: SDUPTHER

## 2021-09-13 NOTE — PROGRESS NOTES
[facility], [address]  Phone [phone]   Fax [phone]      OFFICE VISIT: 2021    Olga Greenberg-: 1970      HPI  Reason For Visit:  Tharon Aase is a 46 y.o. Anxiety    Patient presents via  [employer]  video conferencing on follow-up for chronic anxiety.     She typically takes clonazepam 1 mg on a weekly basis for her anxiety. This medication is effective at managing her anxiety. She typically does take up to 3 a day to control her symptoms. Last prescription of clonazepam 1 mg was on 2021 for number 90 tablets  Adjunct medications:              Lexapro 20 mg daily              Rexulti 1 mg daily              Gabapentin 100 mg 3 times daily  Symptoms: Anxiety is controlled to functional state.     WILLA was reviewed today per office protocol. Report shows No discrepancies.  Fill pattern is consistent from single provider(s) at single pharmacy(s). Request #       vitals were not taken for this visit. There is no height or weight on file to calculate BMI. I have reviewed the following with the Ms. Greenberg   Lab Review  Orders Only on 2021   Component Date Value    Microalbumin, Random Uri* 2021 <1.20     Creatinine, Ur 2021 89.1     Microalbumin Creatinine * 2021 see below     TSH 2021 1.470     T4 Free 2021 1.14     Cholesterol, Total 2021 166     Triglycerides 2021 150*    HDL 2021 47*    LDL Calculated 2021 89     Sodium 2021 137     Potassium 2021 4.6     Chloride 2021 102     CO2 2021 24     Anion Gap 2021 11     Glucose 2021 79     BUN 2021 9     CREATININE 2021 0.7     GFR Non- 2021 >60     GFR  2021 >59     Calcium 2021 9.3     Total Protein 2021 7.0     Albumin 2021 4.4     Total Bilirubin 2021 0.6     Alkaline Phosphatase 05/12/2021 58     ALT 05/12/2021 9     AST 05/12/2021 16     WBC 05/12/2021 8.0     RBC 05/12/2021 4.22     Hemoglobin 05/12/2021 13.8     Hematocrit 05/12/2021 43.0     MCV 05/12/2021 101.9*    MCH 05/12/2021 32.7*    MCHC 05/12/2021 32.1*    RDW 05/12/2021 12.1     Platelets 37/32/3641 283     MPV 05/12/2021 10.2     Neutrophils % 05/12/2021 51.8     Lymphocytes % 05/12/2021 36.3     Monocytes % 05/12/2021 6.8     Eosinophils % 05/12/2021 4.0     Basophils % 05/12/2021 0.6     Neutrophils Absolute 05/12/2021 4.1     Immature Granulocytes # 05/12/2021 0.0     Lymphocytes Absolute 05/12/2021 2.9     Monocytes Absolute 05/12/2021 0.50     Eosinophils Absolute 05/12/2021 0.30     Basophils Absolute 05/12/2021 0.10    Office Visit on 04/13/2021   Component Date Value    Color, UA 04/13/2021 light yellow     Clarity, UA 04/13/2021 clear     Glucose, UA POC 04/13/2021 negative     Bilirubin, UA 04/13/2021 negative     Ketones, UA 04/13/2021 negative     Spec Grav, UA 04/13/2021 1.020     Blood, UA POC 04/13/2021 negative     pH, UA 04/13/2021 7.0     Protein, UA POC 04/13/2021 negative     Urobilinogen, UA 04/13/2021 0.2     Leukocytes, UA 04/13/2021 negative     Nitrite, UA 04/13/2021 negative     Appearance, Fluid 04/13/2021 Clear     Amphetamine Screen, Urine 04/13/2021 Neg     Barbiturate Screen, Urine 04/13/2021 Neg     Benzodiazepine Screen, U* 04/13/2021 Neg     Buprenorphine Urine 04/13/2021 Neg     Cocaine Metabolite Scree* 04/13/2021 Neg     Methamphetamine, Urine 04/13/2021 Neg     Opiate Scrn, Ur 04/13/2021 Neg     Oxycodone Screen, Ur 04/13/2021 Neg     THC Screen, Urine 04/13/2021 Neg      Copies of these are in the chart. Current Outpatient Medications   Medication Sig Dispense Refill    clonazePAM (KLONOPIN) 1 MG tablet Take 1 tablet by mouth 3 times daily as needed for Anxiety for up to 30 days.  90 tablet 0    escitalopram (LEXAPRO) 20 MG tablet Take 1.5 tablets by mouth daily 135 tablet 3     No current facility-administered medications for this visit. Allergies: Sulfa antibiotics     Past Medical History:   Diagnosis Date    Insomnia        Family History   Problem Relation Age of Onset    Ulcerative Colitis Mother     High Blood Pressure Father     High Blood Pressure Brother     Crohn's Disease Maternal Aunt     Ulcerative Colitis Paternal Aunt     Ulcerative Colitis Paternal Uncle     Colon Cancer Paternal Grandfather        Past Surgical History:   Procedure Laterality Date    BREAST BIOPSY Left 2017    COLONOSCOPY      HEMORRHOID SURGERY  01/2018    TUBAL LIGATION         Social History     Tobacco Use    Smoking status: Current Some Day Smoker     Packs/day: 0.10     Years: 25.00     Pack years: 2.50     Types: Cigarettes    Smokeless tobacco: Never Used    Tobacco comment: just a social smoker   Substance Use Topics    Alcohol use: Yes     Comment: socailly        Review of Systems   Constitutional: Negative. HENT: Negative. Eyes: Negative. Respiratory: Negative. Cardiovascular: Negative. Gastrointestinal: Negative. Endocrine: Negative. Genitourinary: Negative. Musculoskeletal: Negative. Skin: Negative. Allergic/Immunologic: Negative. Neurological: Negative. Hematological: Negative. Psychiatric/Behavioral: The patient is nervous/anxious (stressful time in life, but better control). She has been smoking more recently. Physical Exam  PE was not done today as this was a video conference visit. ASSESSMENT      ICD-10-CM    1. Generalized anxiety disorder  F41.1 clonazePAM (KLONOPIN) 1 MG tablet         PLAN    1. Generalized anxiety disorder  She is doing very well on present clonazepam dosing. We will continue the same  - clonazePAM (KLONOPIN) 1 MG tablet; Take 1 tablet by mouth 3 times daily as needed for Anxiety for up to 30 days. Dispense: 90 tablet;  Refill: 0      No orders of the defined types were placed in this encounter. Return in about 1 month (around 10/13/2021) for Alessandro Sims, was evaluated through a synchronous (real-time) audio-video encounter. The patient (or guardian if applicable) is aware that this is a billable service. Verbal consent to proceed has been obtained within the past 12 months. The visit was conducted pursuant to the emergency declaration under the [address] [place] [address] and the [employer] General Act. Patient identification was verified, and a caregiver was present when appropriate. The patient was located in a state where the provider was credentialed to provide care. Total time spent for this encounter: 15m    --LETA Campbell DO on 9/13/2021 at 5:48 PM    An electronic signature was used to authenticate this note.

## 2021-09-13 NOTE — PATIENT INSTRUCTIONS
Patient Education        Anxiety Disorder: Care Instructions  Your Care Instructions     Anxiety is a normal reaction to stress. Difficult situations can cause you to have symptoms such as sweaty palms and a nervous feeling. In an anxiety disorder, the symptoms are far more severe. Constant worry, muscle tension, trouble sleeping, nausea and diarrhea, and other symptoms can make normal daily activities difficult or impossible. These symptoms may occur for no reason, and they can affect your work, school, or social life. Medicines, counseling, and self-care can all help. Follow-up care is a key part of your treatment and safety. Be sure to make and go to all appointments, and call your doctor if you are having problems. It's also a good idea to know your test results and keep a list of the medicines you take. How can you care for yourself at home? · Take medicines exactly as directed. Call your doctor if you think you are having a problem with your medicine. · Go to your counseling sessions and follow-up appointments. · Recognize and accept your anxiety. Then, when you are in a situation that makes you anxious, say to yourself, \"This is not an emergency. I feel uncomfortable, but I am not in danger. I can keep going even if I feel anxious. \"  · Be kind to your body:  ? Relieve tension with exercise or a massage. ? Get enough rest.  ? Avoid alcohol, caffeine, nicotine, and illegal drugs. They can increase your anxiety level and cause sleep problems. ? Learn and do relaxation techniques. See below for more about these techniques. · Engage your mind. Get out and do something you enjoy. Go to a funny movie, or take a walk or hike. Plan your day. Having too much or too little to do can make you anxious. · Keep a record of your symptoms. Discuss your fears with a good friend or family member, or join a support group for people with similar problems. Talking to others sometimes relieves stress.   · Get involved in social groups, or volunteer to help others. Being alone sometimes makes things seem worse than they are. · Get at least 30 minutes of exercise on most days of the week to relieve stress. Walking is a good choice. You also may want to do other activities, such as running, swimming, cycling, or playing tennis or team sports. Relaxation techniques  Do relaxation exercises 10 to 20 minutes a day. You can play soothing, relaxing music while you do them, if you wish. · Tell others in your house that you are going to do your relaxation exercises. Ask them not to disturb you. · Find a comfortable place, away from all distractions and noise. · Lie down on your back, or sit with your back straight. · Focus on your breathing. Make it slow and steady. · Breathe in through your nose. Breathe out through either your nose or mouth. · Breathe deeply, filling up the area between your navel and your rib cage. Breathe so that your belly goes up and down. · Do not hold your breath. · Breathe like this for 5 to 10 minutes. Notice the feeling of calmness throughout your whole body. As you continue to breathe slowly and deeply, relax by doing the following for another 5 to 10 minutes:  · Tighten and relax each muscle group in your body. You can begin at your toes and work your way up to your head. · Imagine your muscle groups relaxing and becoming heavy. · Empty your mind of all thoughts. · Let yourself relax more and more deeply. · Become aware of the state of calmness that surrounds you. · When your relaxation time is over, you can bring yourself back to alertness by moving your fingers and toes and then your hands and feet and then stretching and moving your entire body. Sometimes people fall asleep during relaxation, but they usually wake up shortly afterward. · Always give yourself time to return to full alertness before you drive a car or do anything that might cause an accident if you are not fully alert.  Never play a relaxation tape while you drive a car. When should you call for help? Call 911 anytime you think you may need emergency care. For example, call if:    · You feel you cannot stop from hurting yourself or someone else. Keep the numbers for these national suicide hotlines: 7-697-028-TALK (5-412.371.3727) and 7-370-LQGBCDN (6-488.188.9286). If you or someone you know talks about suicide or feeling hopeless, get help right away. Watch closely for changes in your health, and be sure to contact your doctor if:    · You have anxiety or fear that affects your life.     · You have symptoms of anxiety that are new or different from those you had before. Where can you learn more? Go to https://[employer] Technology.Kan[employer] Services. org and sign in to your ObserveIT account. Enter P754 in the Share Some Style box to learn more about \"Anxiety Disorder: Care Instructions. \"     If you do not have an account, please click on the \"Sign Up Now\" link. Current as of: September 23, 2020               Content Version: 12.9  © 2006-2021 [employer]). Care instructions adapted under license by [facility] ([facility]). If you have questions about a medical condition or this instruction, always ask your healthcare professional. [place] [address] any warranty or liability for your use of this information. Patient Education        Learning About Anxiety Disorders  What are anxiety disorders? Anxiety disorders are a type of medical problem. They cause severe anxiety. When you feel anxious, you feel that something bad is about to happen. This feeling interferes with your life. These disorders include:  · Generalized anxiety disorder. You feel worried and stressed about many everyday events and activities. This goes on for several months and disrupts your life on most days. · Panic disorder. You have repeated panic attacks. A panic attack is a sudden, intense fear or anxiety.  It may make you feel short of breath. Your heart may pound. · Social anxiety disorder. You feel very anxious about what you will say or do in front of people. For example, you may be scared to talk or eat in public. This problem affects your daily life. · Phobias. You are very scared of a specific object, situation, or activity. For example, you may fear spiders, high places, or small spaces. What are the symptoms? Generalized anxiety disorder  Symptoms may include:  · Feeling worried and stressed about many things almost every day. · Feeling tired or irritable. You may have a hard time concentrating. · Having headaches or muscle aches. · Having a hard time getting to sleep or staying asleep. Panic disorder  You may have repeated panic attacks when there is no reason for feeling afraid. You may change your daily activities because you worry that you will have another attack. Symptoms may include:  · Intense fear, terror, or anxiety. · Trouble breathing or very fast breathing. · Chest pain or tightness. · A heartbeat that races or is not regular. Social anxiety disorder  Symptoms may include:  · Fear about a social situation, such as eating in front of others or speaking in public. You may worry a lot. Or you may be afraid that something bad will happen. · Anxiety that can cause you to blush, sweat, and feel shaky. · A heartbeat that is faster than normal.  · A hard time focusing. Phobias  Symptoms may include:  · More fear than most people of being around an object, being in a situation, or doing an activity. You might also be stressed about the chance of being around the thing you fear. · Worry about losing control, panicking, fainting, or having physical symptoms like a faster heartbeat when you are around the situation or object. How are these disorders treated? Anxiety disorders can be treated with medicines or counseling. A combination of both may be used. Medicines may include:  · Antidepressants.  These may help your symptoms by keeping chemicals in your brain in balance. · Benzodiazepines. These may give you short-term relief of your symptoms. Some people use cognitive-behavioral therapy. A therapist helps you learn to change stressful or bad thoughts into helpful thoughts. Lead a healthy lifestyle  A healthy lifestyle may help you feel better. · Get at least 30 minutes of exercise on most days of the week. Walking is a good choice. · Eat a healthy diet. Include fruits, vegetables, lean proteins, and whole grains in your diet each day. · Try to go to bed at the same time every night. Try for 8 hours of sleep a night. · Find ways to manage stress. Try relaxation exercises. · Avoid alcohol and illegal drugs. Follow-up care is a key part of your treatment and safety. Be sure to make and go to all appointments, and call your doctor if you are having problems. It's also a good idea to know your test results and keep a list of the medicines you take. Where can you learn more? Go to https://MD RevolutionpeTLabs."Hey, Neighbor!". org and sign in to your AcceloWeb account. Enter T347 in the FreeBorders box to learn more about \"Learning About Anxiety Disorders. \"     If you do not have an account, please click on the \"Sign Up Now\" link. Current as of: September 23, 2020               Content Version: 12.9  © 2006-2021 Healthwise, Incorporated. Care instructions adapted under license by [facility] ([facility]). If you have questions about a medical condition or this instruction, always ask your healthcare professional. [place] [address] any warranty or liability for your use of this information. Patient Education        Generalized Anxiety Disorder: Care Instructions  Overview     We all worry. It's a normal part of life. But when you have generalized anxiety disorder, you worry about lots of things. You have a hard time not worrying.  This worry or anxiety interferes with your relationships, work or school, and other areas of your life. You may worry most days about things like money, health, work, or friends. That may make you feel tired, tense, or cranky. It can make it hard to think. It may get in the way of healthy sleep. Counseling and medicine can both work to treat anxiety. They are often used together with lifestyle changes, such as getting enough sleep. Treatment can include a type of counseling called cognitive-behavioral therapy, or CBT. It helps you notice and replace thoughts that make you worry. You also might have counseling along with those closest to you so that they can help. Follow-up care is a key part of your treatment and safety. Be sure to make and go to all appointments, and call your doctor if you are having problems. It's also a good idea to know your test results and keep a list of the medicines you take. How can you care for yourself at home? · Get at least 30 minutes of exercise on most days of the week. Walking is a good choice. You also may want to do other activities, such as running, swimming, cycling, or playing tennis or team sports. · Learn and do relaxation exercises, such as deep breathing. · Go to bed at the same time every night. Try for 8 to 10 hours of sleep a night. · Avoid alcohol, marijuana, and illegal drugs. · Find a counselor who uses cognitive-behavioral therapy (CBT). · Don't isolate yourself. Let those closest to you help you. Find someone you can trust and confide in. Talk to that person. · Be safe with medicines. Take your medicines exactly as prescribed. Call your doctor if you think you are having a problem with your medicine. · Practice healthy thinking. How you think can affect how you feel and act. Ask yourself if your thoughts are helpful or unhelpful. If they are unhelpful, you can learn how to change them. · Recognize and accept your anxiety. When you feel anxious, say to yourself, \"This is not an emergency.  I feel uncomfortable, but I am not in danger. I can keep going even if I feel anxious. \"  When should you call for help? Call 911  anytime you think you may need emergency care. For example, call if:    · You feel you can't stop from hurting yourself or someone else. Keep the numbers for these national suicide hotlines: 5-074-153-TALK (2-169.668.1864) and 7-161-GNQEHIE (1-278.252.8397). If you or someone you know talks about suicide or feeling hopeless, get help right away. Call your doctor or counselor now or seek immediate medical care if:    · You have new anxiety, or your anxiety gets worse.     · You have been feeling sad, depressed, or hopeless or have lost interest in things that you usually enjoy.     · You do not get better as expected. Where can you learn more? Go to https://Anyang Phoenix Photovoltaic Technologypesamara.Clear Story Systems. org and sign in to your Simplebooklet account. Enter G123 in the Neopolitan Networks box to learn more about \"Generalized Anxiety Disorder: Care Instructions. \"     If you do not have an account, please click on the \"Sign Up Now\" link. Current as of: November 3, 2020               Content Version: 12.9  © 2006-2021 [employer]. Care instructions adapted under license by [facility] ([facility]). If you have questions about a medical condition or this instruction, always ask your healthcare professional. [place] [address] any warranty or liability for your use of this information. Patient Education        Learning About Generalized Anxiety Disorder  What is generalized anxiety disorder? We all worry. It's a normal part of life. But when you have generalized anxiety disorder, you worry about lots of things and have a hard time stopping your worry. This worry or anxiety interferes with your relationships, work, and life. What causes it? The cause of generalized anxiety disorder is not known. Some studies show that it might be passed down through families. Several things can cause symptoms of anxiety. They include some health problems, some medicines, too much caffeine, and illegal drugs such as cocaine. What are the symptoms? Generalized anxiety disorder can make you feel worried and stressed about many things almost every day. You may have a hard time controlling your worry. Symptoms include:  · Feeling tired or cranky. You may have a hard time concentrating. · Having headaches or muscle aches. · Feeling shaky, sweating, or having hot flashes. · Feeling lightheaded, sick to your stomach, or out of breath. · Feeling like you can't relax. Or being startled easily. · Having problems falling or staying asleep. How is it diagnosed? Your doctor will ask about your health and how often you worry or feel anxious. People with generalized anxiety disorder have more worry and stress than normal. They feel worried and stressed about many things almost every day. And these feelings have lasted for at least 6 months. Your doctor also may ask about other symptoms, like whether you:  · Feel restless. · Feel tired. · Have a hard time thinking or feel that your mind goes blank. · Feel cranky. · Have tense muscles. · Have sleep problems. A physical exam and tests can help make sure that your symptoms aren't caused by a different condition, such as a thyroid problem. How is it treated? Counseling and medicine can both work to treat anxiety. The two are often used along with lifestyle changes. Cognitive-behavioral therapy (CBT) is a type of counseling that's used to help treat anxiety. In CBT, you learn how to notice and replace thoughts that make you feel worried. It also can help you learn how to relax when you worry. Applied relaxation therapy may also be used. Your counselor might ask you to imagine a calming situation. This can help you relax. Medicines can help. These medicines are often also used for depression.  Selective serotonin reuptake inhibitors (SSRIs) are often tried first. But there are other medicines that your doctor may use. You may need to try a few medicines to find one that works well. Many people feel better by getting regular exercise, eating healthy meals, and getting good sleep. Mindfulnessfocusing on things in the present momentalso can help reduce your anxiety. What can you expect when you have it? Having anxiety can be upsetting. Some people might feel less worried and stressed after a couple of months of treatment. But for other people, it might take longer to feel better. Reaching out to people for help is important. Try not to isolate yourself. Let your family and friends help you. Find someone you can trust and confide in. Talk to that person. When you know what anxiety isand how you can get help for ityou can start to learn new ways of thinking. This can help you cope and work through your anxiety. Follow-up care is a key part of your treatment and safety. Be sure to make and go to all appointments, and call your doctor if you are having problems. It's also a good idea to know your test results and keep a list of the medicines you take. Where can you learn more? Go to https://Naviswisspepic"Gameface Media[employer] Inc.".Mogujie. org and sign in to your Continuent account. Enter G110 in the DrDoctor box to learn more about \"Learning About Generalized Anxiety Disorder. \"     If you do not have an account, please click on the \"Sign Up Now\" link. Current as of: September 23, 2020               Content Version: 12.9  © 9802-8626 [employer], [facility]. Care instructions adapted under license by [facility] ([facility]). If you have questions about a medical condition or this instruction, always ask your healthcare professional. [place] [address] any warranty or liability for your use of this information.

## 2021-09-28 ENCOUNTER — PATIENT MESSAGE (OUTPATIENT)
Dept: PRIMARY CARE CLINIC | Age: 51
End: 2021-09-28

## 2021-09-28 NOTE — TELEPHONE ENCOUNTER
From: Dana Johnson  To: Doris Black, DO  Sent: 9/28/2021 10:28 AM CDT  Subject: Prescription Question    Hi Dr. 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 Our Doctor in [place] always prescribed that and we always had it on hand. . I sent a picture of what we would get. Still no insurance.   They are expecting to get it figured out by November or December 1st at the latest.   Thank you very much  [facility]

## 2021-09-29 NOTE — TELEPHONE ENCOUNTER
Please call in Silvadene as requested.   Apply topically twice daily  Dispense large tube with three refills

## 2021-10-11 ENCOUNTER — PATIENT MESSAGE (OUTPATIENT)
Dept: PRIMARY CARE CLINIC | Age: 51
End: 2021-10-11

## 2021-10-11 DIAGNOSIS — F41.1 GENERALIZED ANXIETY DISORDER: ICD-10-CM

## 2021-10-12 RX ORDER — CLONAZEPAM 1 MG/1
1 TABLET ORAL 3 TIMES DAILY PRN
Qty: 90 TABLET | Refills: 0 | Status: SHIPPED | OUTPATIENT
Start: 2021-10-12 | End: 2021-11-11 | Stop reason: SDUPTHER

## 2021-10-12 NOTE — TELEPHONE ENCOUNTER
Dustin Lanes, Marguerite Conrad, DO 20 minutes ago (10:14 AM)     And I would need them called in today. He made a point to tell me to message him the day before So that we could get it taken care of. Nikolas Greenberg, DO 22 minutes ago (10:12 AM)     I talked to Dr. Gabby Pena at our last appointment and he told me since I don't have insurance right now that he would do that so I didn't have to see him physically.  Please speak to him about it as he is the one who requested that I do this

## 2021-10-12 NOTE — TELEPHONE ENCOUNTER
From: Morro Lin  To: Ger Gonsalves DO  Sent: 10/11/2021 6:46 PM CDT  Subject: Prescription Question    Dr. Gabby Pena   Could you please call my clonazepam in to [facility]?   Thank you very much  [facility]

## 2021-10-15 ENCOUNTER — TELEPHONE (OUTPATIENT)
Dept: PRIMARY CARE CLINIC | Age: 51
End: 2021-10-15

## 2021-10-15 NOTE — TELEPHONE ENCOUNTER
Pt called and LM that she tested pos for COVID> she wants to know if she can get a cough med. Nothing OTC is working.

## 2021-11-11 DIAGNOSIS — F41.1 GENERALIZED ANXIETY DISORDER: ICD-10-CM

## 2021-11-11 RX ORDER — CLONAZEPAM 1 MG/1
1 TABLET ORAL 3 TIMES DAILY PRN
Qty: 90 TABLET | Refills: 0 | Status: SHIPPED | OUTPATIENT
Start: 2021-11-11 | End: 2021-12-07 | Stop reason: SDUPTHER

## 2021-11-11 NOTE — TELEPHONE ENCOUNTER
Received fax from pharmacy requesting refill on pts medication(s). Pt was last seen in office on 9/13/2021  and has a follow up scheduled for Visit date not found. Will send request to  Dr. Justyn Cortez  for authorization. Requested Prescriptions     Pending Prescriptions Disp Refills    clonazePAM (KLONOPIN) 1 MG tablet 90 tablet 0     Sig: Take 1 tablet by mouth 3 times daily as needed for Anxiety for up to 30 days.

## 2021-12-07 ENCOUNTER — VIRTUAL VISIT (OUTPATIENT)
Dept: PRIMARY CARE CLINIC | Age: 51
End: 2021-12-07
Payer: COMMERCIAL

## 2021-12-07 DIAGNOSIS — F41.1 GENERALIZED ANXIETY DISORDER: ICD-10-CM

## 2021-12-07 PROCEDURE — 99213 OFFICE O/P EST LOW 20 MIN: CPT | Performed by: PEDIATRICS

## 2021-12-07 RX ORDER — CLONAZEPAM 1 MG/1
1 TABLET ORAL 3 TIMES DAILY PRN
Qty: 90 TABLET | Refills: 0 | Status: SHIPPED | OUTPATIENT
Start: 2021-12-07 | End: 2022-01-07 | Stop reason: SDUPTHER

## 2021-12-07 ASSESSMENT — ENCOUNTER SYMPTOMS
GASTROINTESTINAL NEGATIVE: 1
RESPIRATORY NEGATIVE: 1
EYES NEGATIVE: 1
ALLERGIC/IMMUNOLOGIC NEGATIVE: 1

## 2021-12-07 NOTE — PROGRESS NOTES
[facility] [facility], [address]  Phone [phone]   Fax [phone]      OFFICE VISIT: 2021    Jose Elias Penningtonkriss-: 1970      HPI  Reason For Visit:  Angel Barreto is a 46 y.o. Anxiety    Patient presents via telephone conferencing on follow-up for chronic anxiety.     She typically takes clonazepam 1 mg on a weekly basis for her anxiety. This medication is effective at managing her anxiety. She typically does take up to 3 a day to control her symptoms. Last prescription of clonazepam 1 mg was on 2021 for number 90 tablets  Adjunct medications:              Lexapro 20 mg daily              Rexulti 1 mg daily              Gabapentin 100 mg 3 times daily  Symptoms: Anxiety is controlled to functional state.     WILLA was unavailable at this time.    vitals were not taken for this visit. There is no height or weight on file to calculate BMI. I have reviewed the following with the Ms. Greenberg   Lab Review  No visits with results within 6 Month(s) from this visit.    Latest known visit with results is:   Orders Only on 2021   Component Date Value    Microalbumin, Random Uri* 2021 <1.20     Creatinine, Ur 2021 89.1     Microalbumin Creatinine * 2021 see below     TSH 2021 1.470     T4 Free 2021 1.14     Cholesterol, Total 2021 166     Triglycerides 2021 150*    HDL 2021 47*    LDL Calculated 2021 89     Sodium 2021 137     Potassium 2021 4.6     Chloride 2021 102     CO2 2021 24     Anion Gap 2021 11     Glucose 2021 79     BUN 2021 9     CREATININE 2021 0.7     GFR Non- 2021 >60     GFR  2021 >59     Calcium 2021 9.3     Total Protein 2021 7.0     Albumin 2021 4.4     Total Bilirubin 2021 0.6     Alkaline Phosphatase 2021 58     ALT 2021 9     AST Substance Use Topics    Alcohol use: Yes     Comment: socailly        Review of Systems   Constitutional: Negative. HENT: Negative. Eyes: Negative. Respiratory: Negative. Cardiovascular: Negative. Gastrointestinal: Negative. Endocrine: Negative. Genitourinary: Negative. Musculoskeletal: Negative. Skin: Negative. Allergic/Immunologic: Negative. Neurological: Negative. Hematological: Negative. Psychiatric/Behavioral: The patient is nervous/anxious (stressful time in life, but better control). She has been smoking more recently. Physical Exam  Physical exam was not performed today as this was a telephone conference visit      ASSESSMENT      ICD-10-CM    1. Generalized anxiety disorder  F41.1 clonazePAM (KLONOPIN) 1 MG tablet         PLAN    1. Generalized anxiety disorder  She is thank you doing very well on this present medication regimen. We will continue the same  - clonazePAM (KLONOPIN) 1 MG tablet; Take 1 tablet by mouth 3 times daily as needed for Anxiety for up to 30 days. Dispense: 90 tablet; Refill: 0      No orders of the defined types were placed in this encounter. Return in about 1 month (around 1/7/2022) for 15. Due to patients co-morbidities and risk for potential exposure of COVID 19 pandemic. Patient was contacted and agreed to proceed with a telephone virtual visit. The risks and benefits of converting to a telephone virtual visit were discussed in light of the current infectious disease epidemic. Patient also understood that insurance coverage and co-pays are up to their individual insurance plans. Provider performed history of present illness and review of systems. Diagnosis and treatment plan was discussed with patient. Pharmacy of choice was reviewed along with past medical history, medication allergies, and current medications.  Education provided to patient or patient parents/guardian with current illness diagnosis as well as when to seek additional healthcare due to changing or for worsening symptoms. Patient voiced understanding. 15 minutes were spent on the phone with patient. Priyanka Jones, was evaluated through a synchronous (real-time) audio-video encounter. The patient (or guardian if applicable) is aware that this is a billable service. Verbal consent to proceed has been obtained within the past 12 months. The visit was conducted pursuant to the emergency declaration under the [address] [place] [address] and the [employer] General Act. Patient identification was verified, and a caregiver was present when appropriate. The patient was located in a state where the provider was credentialed to provide care. Total time spent for this encounter: 15m    --LETA Cohn DO on 12/7/2021 at 9:25 AM    An electronic signature was used to authenticate this note.

## 2021-12-07 NOTE — PATIENT INSTRUCTIONS
Patient Education        Learning About Anxiety Disorders  What are anxiety disorders? Anxiety disorders are a type of medical problem. They cause severe anxiety. When you feel anxious, you feel that something bad is about to happen. This feeling interferes with your life. These disorders include:  · Generalized anxiety disorder. You feel worried and stressed about many everyday events and activities. This goes on for several months and disrupts your life on most days. · Panic disorder. You have repeated panic attacks. A panic attack is a sudden, intense fear or anxiety. It may make you feel short of breath. Your heart may pound. · Social anxiety disorder. You feel very anxious about what you will say or do in front of people. For example, you may be scared to talk or eat in public. This problem affects your daily life. · Phobias. You are very scared of a specific object, situation, or activity. For example, you may fear spiders, high places, or small spaces. What are the symptoms? Generalized anxiety disorder  Symptoms may include:  · Feeling worried and stressed about many things almost every day. · Feeling tired or irritable. You may have a hard time concentrating. · Having headaches or muscle aches. · Having a hard time getting to sleep or staying asleep. Panic disorder  You may have repeated panic attacks when there is no reason for feeling afraid. You may change your daily activities because you worry that you will have another attack. Symptoms may include:  · Intense fear, terror, or anxiety. · Trouble breathing or very fast breathing. · Chest pain or tightness. · A heartbeat that races or is not regular. Social anxiety disorder  Symptoms may include:  · Fear about a social situation, such as eating in front of others or speaking in public. You may worry a lot. Or you may be afraid that something bad will happen. · Anxiety that can cause you to blush, sweat, and feel shaky.   · A heartbeat that is faster than normal.  · A hard time focusing. Phobias  Symptoms may include:  · More fear than most people of being around an object, being in a situation, or doing an activity. You might also be stressed about the chance of being around the thing you fear. · Worry about losing control, panicking, fainting, or having physical symptoms like a faster heartbeat when you are around the situation or object. How are these disorders treated? Anxiety disorders can be treated with medicines or counseling. A combination of both may be used. Medicines may include:  · Antidepressants. These may help your symptoms by keeping chemicals in your brain in balance. · Benzodiazepines. These may give you short-term relief of your symptoms. Some people use cognitive-behavioral therapy. A therapist helps you learn to change stressful or bad thoughts into helpful thoughts. Lead a healthy lifestyle  A healthy lifestyle may help you feel better. · Get at least 30 minutes of exercise on most days of the week. Walking is a good choice. · Eat a healthy diet. Include fruits, vegetables, lean proteins, and whole grains in your diet each day. · Try to go to bed at the same time every night. Try for 8 hours of sleep a night. · Find ways to manage stress. Try relaxation exercises. · Avoid alcohol and illegal drugs. Follow-up care is a key part of your treatment and safety. Be sure to make and go to all appointments, and call your doctor if you are having problems. It's also a good idea to know your test results and keep a list of the medicines you take. Where can you learn more? Go to https://mojgan.OneRoof. org and sign in to your [employer] account. Enter Y455 in the [employer] box to learn more about \"Learning About Anxiety Disorders. \"     If you do not have an account, please click on the \"Sign Up Now\" link.   Current as of: June 16, 2021               Content Version: 13.0  © 9542-6788 Healthwise, Incorporated. Care instructions adapted under license by [facility] ([facility]). If you have questions about a medical condition or this instruction, always ask your healthcare professional. [address] any warranty or liability for your use of this information.

## 2022-01-06 ENCOUNTER — TELEPHONE (OUTPATIENT)
Dept: PRIMARY CARE CLINIC | Age: 52
End: 2022-01-06

## 2022-01-06 NOTE — TELEPHONE ENCOUNTER
----- Message from Treasure Britany sent at 1/5/2022  5:14 PM CST -----  Subject: Appointment Request    Reason for Call: Urgent (Patient Request) No Script    QUESTIONS  Type of Appointment? Established Patient  Reason for appointment request? Available appointments did not meet   patient need  Additional Information for Provider? Pt received message in Wi3 to   book 1 month f/u VV appt for this week as she will be out of medication on   1/10.  ---------------------------------------------------------------------------  --------------  CALL BACK INFO  What is the best way for the office to contact you? OK to leave message on   voicemail, OK to respond with electronic message via Wi3 portal (only   for patients who have registered Wi3 account)  Preferred Call Back Phone Number? [phone]  ---------------------------------------------------------------------------  --------------  SCRIPT ANSWERS  Relationship to Patient? Self  (Is the patient requesting to see the provider for a procedure?)? No  (Is the patient requesting to see the provider urgently  today or   tomorrow. )? Yes  Have you been diagnosed with, awaiting test results for, or told that you   are suspected of having COVID-19 (Coronavirus)? (If patient has tested   negative or was tested as a requirement for work, school, or travel and   not based on symptoms, answer no)? No  Within the past two weeks have you developed any of the following symptoms   (answer no if symptoms have been present longer than 2 weeks or began   more than 2 weeks ago)? Fever or Chills, Cough, Shortness of breath or   difficulty breathing, Loss of taste or smell, Sore throat, Nasal   congestion, Sneezing or runny nose, Fatigue or generalized body aches   (answer no if pain is specific to a body part e.g. back pain), Diarrhea,   Headache? No  Have you had close contact with someone with COVID-19 in the last 14 days?    No  (Service Expert  click yes below to proceed with [address] As Usual   Scheduling)?  Yes nonweightbearing left lower extremity.  elevate, ice, pain control, aspirin twice a day for DVT prophylaxis., bring boot to follow up appointment in 2 weeks.

## 2022-01-07 ENCOUNTER — VIRTUAL VISIT (OUTPATIENT)
Dept: PRIMARY CARE CLINIC | Age: 52
End: 2022-01-07
Payer: COMMERCIAL

## 2022-01-07 DIAGNOSIS — F41.1 GENERALIZED ANXIETY DISORDER: ICD-10-CM

## 2022-01-07 PROCEDURE — 99213 OFFICE O/P EST LOW 20 MIN: CPT | Performed by: PEDIATRICS

## 2022-01-07 PROCEDURE — 3017F COLORECTAL CA SCREEN DOC REV: CPT | Performed by: PEDIATRICS

## 2022-01-07 PROCEDURE — G8428 CUR MEDS NOT DOCUMENT: HCPCS | Performed by: PEDIATRICS

## 2022-01-07 RX ORDER — CLONAZEPAM 1 MG/1
1 TABLET ORAL 3 TIMES DAILY PRN
Qty: 90 TABLET | Refills: 0 | Status: SHIPPED | OUTPATIENT
Start: 2022-01-07 | End: 2022-02-08 | Stop reason: SDUPTHER

## 2022-01-07 NOTE — PATIENT INSTRUCTIONS
Patient Education        Learning About Anxiety Disorders  What are anxiety disorders? Anxiety disorders are a type of medical problem. They cause severe anxiety. When you feel anxious, you feel that something bad is about to happen. This feeling interferes with your life. These disorders include:  · Generalized anxiety disorder. You feel worried and stressed about many everyday events and activities. This goes on for several months and disrupts your life on most days. · Panic disorder. You have repeated panic attacks. A panic attack is a sudden, intense fear or anxiety. It may make you feel short of breath. Your heart may pound. · Social anxiety disorder. You feel very anxious about what you will say or do in front of people. For example, you may be scared to talk or eat in public. This problem affects your daily life. · Phobias. You are very scared of a specific object, situation, or activity. For example, you may fear spiders, high places, or small spaces. What are the symptoms? Generalized anxiety disorder  Symptoms may include:  · Feeling worried and stressed about many things almost every day. · Feeling tired or irritable. You may have a hard time concentrating. · Having headaches or muscle aches. · Having a hard time getting to sleep or staying asleep. Panic disorder  You may have repeated panic attacks when there is no reason for feeling afraid. You may change your daily activities because you worry that you will have another attack. Symptoms may include:  · Intense fear, terror, or anxiety. · Trouble breathing or very fast breathing. · Chest pain or tightness. · A heartbeat that races or is not regular. Social anxiety disorder  Symptoms may include:  · Fear about a social situation, such as eating in front of others or speaking in public. You may worry a lot. Or you may be afraid that something bad will happen. · Anxiety that can cause you to blush, sweat, and feel shaky.   · A heartbeat that is faster than normal.  · A hard time focusing. Phobias  Symptoms may include:  · More fear than most people of being around an object, being in a situation, or doing an activity. You might also be stressed about the chance of being around the thing you fear. · Worry about losing control, panicking, fainting, or having physical symptoms like a faster heartbeat when you are around the situation or object. How are these disorders treated? Anxiety disorders can be treated with medicines or counseling. A combination of both may be used. Medicines may include:  · Antidepressants. These may help your symptoms by keeping chemicals in your brain in balance. · Benzodiazepines. These may give you short-term relief of your symptoms. Some people use cognitive-behavioral therapy. A therapist helps you learn to change stressful or bad thoughts into helpful thoughts. Lead a healthy lifestyle  A healthy lifestyle may help you feel better. · Get at least 30 minutes of exercise on most days of the week. Walking is a good choice. · Eat a healthy diet. Include fruits, vegetables, lean proteins, and whole grains in your diet each day. · Try to go to bed at the same time every night. Try for 8 hours of sleep a night. · Find ways to manage stress. Try relaxation exercises. · Avoid alcohol and illegal drugs. Follow-up care is a key part of your treatment and safety. Be sure to make and go to all appointments, and call your doctor if you are having problems. It's also a good idea to know your test results and keep a list of the medicines you take. Where can you learn more? Go to https://mojgan.[employer]. org and sign in to your [employer] account. Enter Z423 in the [employer] box to learn more about \"Learning About Anxiety Disorders. \"     If you do not have an account, please click on the \"Sign Up Now\" link.   Current as of: June 16, 2021               Content Version: 13.1  © 2089-0849 Healthwise, Incorporated. Care instructions adapted under license by [facility] ([facility]). If you have questions about a medical condition or this instruction, always ask your healthcare professional. [address] any warranty or liability for your use of this information.

## 2022-01-07 NOTE — PROGRESS NOTES
17[address] [facility], [address]  Phone [phone]   Fax [phone]      OFFICE VISIT: 2022    Evie Greenberg-: 1970      HPI  Reason For Visit:  Milena is a 46 y.o. Anxiety    Patient presents via doxy. me video conferencing on follow-up for chronic anxiety. She typically takes clonazepam 1 mg on a weekly basis for her anxiety. This medication is effective at managing her anxiety. She typically does take up to 3 a day to control her symptoms. Last prescription of clonazepam 1 mg was on 12/10/2021 for number 90 tablets  Adjunct medications:              Lexapro 20 mg daily              Rexulti 1 mg daily              Gabapentin 100 mg 3 times daily  Symptoms: Anxiety is controlled to functional state. WILLA was reviewed today per office protocol. Report shows No discrepancies. Fill pattern is consistent from single provider(s) at single pharmacy(s). Request #[id number]        vitals were not taken for this visit. There is no height or weight on file to calculate BMI. I have reviewed the following with the Ms. Greenberg   Lab Review  No visits with results within 6 Month(s) from this visit.    Latest known visit with results is:   Orders Only on 2021   Component Date Value    Microalbumin, Random Uri* 2021 <1.20     Creatinine, Ur 2021 89.1     Microalbumin Creatinine * 2021 see below     TSH 2021 1.470     T4 Free 2021 1.14     Cholesterol, Total 2021 166     Triglycerides 2021 150*    HDL 2021 47*    LDL Calculated 2021 89     Sodium 2021 137     Potassium 2021 4.6     Chloride 2021 102     CO2 2021 24     Anion Gap 2021 11     Glucose 2021 79     BUN 2021 9     CREATININE 2021 0.7     GFR Non- 2021 >60     GFR  2021 >59     Calcium 2021 9.3     Total Protein 05/12/2021 7.0     Albumin 05/12/2021 4.4     Total Bilirubin 05/12/2021 0.6     Alkaline Phosphatase 05/12/2021 58     ALT 05/12/2021 9     AST 05/12/2021 16     WBC 05/12/2021 8.0     RBC 05/12/2021 4.22     Hemoglobin 05/12/2021 13.8     Hematocrit 05/12/2021 43.0     MCV 05/12/2021 101.9*    MCH 05/12/2021 32.7*    MCHC 05/12/2021 32.1*    RDW 05/12/2021 12.1     Platelets 24/97/4338 283     MPV 05/12/2021 10.2     Neutrophils % 05/12/2021 51.8     Lymphocytes % 05/12/2021 36.3     Monocytes % 05/12/2021 6.8     Eosinophils % 05/12/2021 4.0     Basophils % 05/12/2021 0.6     Neutrophils Absolute 05/12/2021 4.1     Immature Granulocytes # 05/12/2021 0.0     Lymphocytes Absolute 05/12/2021 2.9     Monocytes Absolute 05/12/2021 0.50     Eosinophils Absolute 05/12/2021 0.30     Basophils Absolute 05/12/2021 0.10      Copies of these are in the chart. Current Outpatient Medications   Medication Sig Dispense Refill    clonazePAM (KLONOPIN) 1 MG tablet Take 1 tablet by mouth 3 times daily as needed for Anxiety for up to 30 days. 90 tablet 0    silver sulfADIAZINE (SILVADENE) 1 % cream Apply topically bid 80 g 0    escitalopram (LEXAPRO) 20 MG tablet Take 1.5 tablets by mouth daily 135 tablet 3     No current facility-administered medications for this visit.        Allergies: Sulfa antibiotics     Past Medical History:   Diagnosis Date    Insomnia        Family History   Problem Relation Age of Onset    Ulcerative Colitis Mother     High Blood Pressure Father     High Blood Pressure Brother     Crohn's Disease Maternal Aunt     Ulcerative Colitis Paternal Aunt     Ulcerative Colitis Paternal Uncle     Colon Cancer Paternal Grandfather        Past Surgical History:   Procedure Laterality Date    BREAST BIOPSY Left 2017    COLONOSCOPY      HEMORRHOID SURGERY  01/2018    TUBAL LIGATION         Social History     Tobacco Use    Smoking status: Current Some Day Smoker Packs/day: 0.10     Years: 25.00     Pack years: 2.50     Types: Cigarettes    Smokeless tobacco: Never Used    Tobacco comment: just a social smoker   Substance Use Topics    Alcohol use: Yes     Comment: socailly        Review of Systems   Constitutional: Negative. HENT: Negative. Eyes: Negative. Respiratory: Negative. Cardiovascular: Negative. Gastrointestinal: Negative. Endocrine: Negative. Genitourinary: Negative. Musculoskeletal: Negative. Skin: Negative. Allergic/Immunologic: Negative. Neurological: Negative. Hematological: Negative. Psychiatric/Behavioral: The patient is nervous/anxious (stressful time in life, but better control). She has been smoking more recently. Physical Exam  Physical exam was not performed today as this was a teleconference visit using doxy. Me      ASSESSMENT      ICD-10-CM    1. Generalized anxiety disorder  F41.1 clonazePAM (KLONOPIN) 1 MG tablet         PLAN    1. Generalized anxiety disorder  This has been very helpful for her. The current medical regimen is effective, so will continue present plan and medications. - clonazePAM (KLONOPIN) 1 MG tablet; Take 1 tablet by mouth 3 times daily as needed for Anxiety for up to 30 days. Dispense: 90 tablet; Refill: 0      No orders of the defined types were placed in this encounter. Return in about 1 month (around 2/7/2022) for 15. Carrie Kingdom, was evaluated through a synchronous (real-time) audio-video encounter. The patient (or guardian if applicable) is aware that this is a billable service. Verbal consent to proceed has been obtained within the past 12 months. The visit was conducted pursuant to the emergency declaration under the [address] [place] [address], [address] [place] [address] and the [employer] General Act. Patient identification was verified, and a caregiver was present when appropriate.  The patient was located in a state where the provider was credentialed to provide care. Total time spent for this encounter: 20m    --LETA Quijano DO on 1/7/2022 at 4:13 PM    An electronic signature was used to authenticate this note.

## 2022-01-21 ENCOUNTER — OFFICE VISIT (OUTPATIENT)
Dept: PRIMARY CARE CLINIC | Age: 52
End: 2022-01-21
Payer: COMMERCIAL

## 2022-01-21 VITALS
HEART RATE: 88 BPM | SYSTOLIC BLOOD PRESSURE: 136 MMHG | OXYGEN SATURATION: 97 % | DIASTOLIC BLOOD PRESSURE: 84 MMHG | WEIGHT: 138 LBS | BODY MASS INDEX: 21.61 KG/M2 | TEMPERATURE: 98.2 F

## 2022-01-21 DIAGNOSIS — R19.7 DIARRHEA OF PRESUMED INFECTIOUS ORIGIN: ICD-10-CM

## 2022-01-21 DIAGNOSIS — R11.0 NAUSEA: ICD-10-CM

## 2022-01-21 DIAGNOSIS — R50.9 FEVER, UNSPECIFIED FEVER CAUSE: Primary | ICD-10-CM

## 2022-01-21 DIAGNOSIS — R05.9 COUGH: ICD-10-CM

## 2022-01-21 LAB
INFLUENZA A ANTIBODY: NORMAL
INFLUENZA B ANTIBODY: NORMAL
S PYO AG THROAT QL: NORMAL

## 2022-01-21 PROCEDURE — 4004F PT TOBACCO SCREEN RCVD TLK: CPT | Performed by: NURSE PRACTITIONER

## 2022-01-21 PROCEDURE — G8484 FLU IMMUNIZE NO ADMIN: HCPCS | Performed by: NURSE PRACTITIONER

## 2022-01-21 PROCEDURE — G8427 DOCREV CUR MEDS BY ELIG CLIN: HCPCS | Performed by: NURSE PRACTITIONER

## 2022-01-21 PROCEDURE — 99213 OFFICE O/P EST LOW 20 MIN: CPT | Performed by: NURSE PRACTITIONER

## 2022-01-21 PROCEDURE — G8420 CALC BMI NORM PARAMETERS: HCPCS | Performed by: NURSE PRACTITIONER

## 2022-01-21 PROCEDURE — 3017F COLORECTAL CA SCREEN DOC REV: CPT | Performed by: NURSE PRACTITIONER

## 2022-01-21 PROCEDURE — 87880 STREP A ASSAY W/OPTIC: CPT | Performed by: NURSE PRACTITIONER

## 2022-01-21 PROCEDURE — 87804 INFLUENZA ASSAY W/OPTIC: CPT | Performed by: NURSE PRACTITIONER

## 2022-01-21 ASSESSMENT — ENCOUNTER SYMPTOMS
RHINORRHEA: 0
COUGH: 1
ABDOMINAL PAIN: 0
DIARRHEA: 1
SHORTNESS OF BREATH: 0
VOMITING: 0
SORE THROAT: 1
NAUSEA: 1

## 2022-01-21 NOTE — PROGRESS NOTES
Flor Patel (:  1970) is a 46 y.o. female,Established patient, here for evaluation of the following chief complaint(s):  Pharyngitis (cough, fever 100.4, started wed. no known exp to covid. last dianosed with covid end of oct. )      ASSESSMENT/PLAN:    ICD-10-CM    1. Fever, unspecified fever cause  R50.9 POCT Influenza A/B: negative     POCT rapid strep A: negative     COVID-19   2. Cough  R05.9 COVID-19  Supportive care  Rest  Hydration  Tylenol as needed  Self quarantine until COVID-19 results return   3. Nausea  R11.0 COVID-19   4. Diarrhea of presumed infectious origin  R19.7 COVID-19       Return if symptoms worsen or fail to improve. SUBJECTIVE/OBJECTIVE:  HPI     \"On Wednesday, I had a sore throat. \"  Reports chills  \"I ran a fever. \"   Fever was 100.4F  She has previously had COVID. Reports cough  Denies SOA  Denies HA  Reports a sore throat  Reports nausea and diarrhea earlier in the week. Denies abdominal pain  Reports nasal congestion    Strep: negative  Flu: negative    /84   Pulse 88   Temp 98.2 °F (36.8 °C) (Temporal)   Wt 138 lb (62.6 kg)   LMP 2022   SpO2 97%   BMI 21.61 kg/m²     Review of Systems   Constitutional: Positive for chills and fever. HENT: Positive for congestion and sore throat. Negative for rhinorrhea. Respiratory: Positive for cough. Negative for shortness of breath. Gastrointestinal: Positive for diarrhea and nausea. Negative for abdominal pain and vomiting. Neurological: Negative for headaches. Physical Exam  Vitals reviewed. Constitutional:       Appearance: Normal appearance. She is well-developed and normal weight. HENT:      Head: Normocephalic. Right Ear: Tympanic membrane, ear canal and external ear normal.      Left Ear: Tympanic membrane, ear canal and external ear normal.      Nose: Nose normal.   Eyes:      General:         Right eye: No discharge. Left eye: No discharge.    Cardiovascular:      Rate and Rhythm: Normal rate and regular rhythm. Pulmonary:      Effort: Pulmonary effort is normal.      Breath sounds: Normal breath sounds. No wheezing, rhonchi or rales. Abdominal:      General: Bowel sounds are normal.      Palpations: Abdomen is soft. Musculoskeletal:      Cervical back: Normal range of motion. Skin:     General: Skin is dry. Neurological:      General: No focal deficit present. Mental Status: She is alert and oriented to person, place, and time. Mental status is at baseline. Psychiatric:         Mood and Affect: Mood normal.         Behavior: Behavior normal.         Thought Content: Thought content normal.         Judgment: Judgment normal.             An electronic signature was used to authenticate this note.     --ZAKI Hoyt

## 2022-01-22 LAB — SARS-COV-2, PCR: NOT DETECTED

## 2022-01-28 ENCOUNTER — PATIENT MESSAGE (OUTPATIENT)
Dept: PRIMARY CARE CLINIC | Age: 52
End: 2022-01-28

## 2022-02-03 NOTE — TELEPHONE ENCOUNTER
From: Benjamin Landin  To: Dr. Eduin Mayen: 1/28/2022 7:03 PM CST  Subject: Referrals    Could I please get referrals/authorization for a preventative mammogram and cervical cancer screenings with a provider covered by my insurance. I am fairly certain I need a referral from my PCP and I am 6 months overdue.    Thank Scot Branham

## 2022-02-08 ENCOUNTER — VIRTUAL VISIT (OUTPATIENT)
Dept: PRIMARY CARE CLINIC | Age: 52
End: 2022-02-08
Payer: COMMERCIAL

## 2022-02-08 DIAGNOSIS — F41.1 GENERALIZED ANXIETY DISORDER: ICD-10-CM

## 2022-02-08 DIAGNOSIS — Z12.31 ENCOUNTER FOR SCREENING MAMMOGRAM FOR MALIGNANT NEOPLASM OF BREAST: Primary | ICD-10-CM

## 2022-02-08 PROCEDURE — 3017F COLORECTAL CA SCREEN DOC REV: CPT | Performed by: PEDIATRICS

## 2022-02-08 PROCEDURE — 99213 OFFICE O/P EST LOW 20 MIN: CPT | Performed by: PEDIATRICS

## 2022-02-08 PROCEDURE — G8428 CUR MEDS NOT DOCUMENT: HCPCS | Performed by: PEDIATRICS

## 2022-02-08 RX ORDER — CLONAZEPAM 1 MG/1
1 TABLET ORAL 3 TIMES DAILY PRN
Qty: 90 TABLET | Refills: 0 | Status: SHIPPED | OUTPATIENT
Start: 2022-02-08 | End: 2022-03-08 | Stop reason: SDUPTHER

## 2022-02-08 NOTE — PROGRESS NOTES
[address] [facility], [address]  Phone [phone]   Fax [phone]      OFFICE VISIT: 2022    Edyta Greenberg-: 1970      HPI  Reason For Visit:  Milena is a 46 y.o. Anxiety    Patient presents via doxy. me video conferencing on follow-up for chronic anxiety.     She typically takes clonazepam 1 mg on a weekly basis for her anxiety. This medication is effective at managing her anxiety. She typically does take up to 3 a day to control her symptoms. Last prescription of clonazepam 1 mg was on 2022 for number 90 tablets  Adjunct medications:              Lexapro 20 mg daily              Rexulti 1 mg daily              Gabapentin 100 mg 3 times daily  Symptoms: Anxiety is controlled to functional state.     WILLA was reviewed today per office protocol. Report shows No discrepancies. Fill pattern is consistent from single provider(s) at single pharmacy(s). Request # [id number]           vitals were not taken for this visit. There is no height or weight on file to calculate BMI. I have reviewed the following with the Ms. Greenberg   Lab Review  Office Visit on 2022   Component Date Value    Influenza A Ab 2022 neg     Influenza B Ab 2022 neg     Strep A Ag 2022 None Detected     SARS-CoV-2, PCR 2022 Not Detected      Copies of these are in the chart. Current Outpatient Medications   Medication Sig Dispense Refill    clonazePAM (KLONOPIN) 1 MG tablet Take 1 tablet by mouth 3 times daily as needed for Anxiety for up to 30 days. 90 tablet 0    escitalopram (LEXAPRO) 20 MG tablet Take 1.5 tablets by mouth daily 135 tablet 3     No current facility-administered medications for this visit.        Allergies: Sulfa antibiotics     Past Medical History:   Diagnosis Date    Insomnia        Family History   Problem Relation Age of Onset    Ulcerative Colitis Mother     High Blood Pressure Father     High Blood Pressure Brother     Crohn's Disease Maternal Aunt     Ulcerative Colitis Paternal Aunt     Ulcerative Colitis Paternal Uncle     Colon Cancer Paternal Grandfather        Past Surgical History:   Procedure Laterality Date    BREAST BIOPSY Left 2017    COLONOSCOPY      HEMORRHOID SURGERY  01/2018    TUBAL LIGATION         Social History     Tobacco Use    Smoking status: Current Some Day Smoker     Packs/day: 0.10     Years: 25.00     Pack years: 2.50     Types: Cigarettes    Smokeless tobacco: Never Used    Tobacco comment: just a social smoker   Substance Use Topics    Alcohol use: Yes     Comment: socailly        Review of Systems   Constitutional: Negative. HENT: Negative. Eyes: Negative. Respiratory: Negative. Cardiovascular: Negative. Gastrointestinal: Negative. Endocrine: Negative. Genitourinary: Negative. Musculoskeletal: Negative. Skin: Negative. Allergic/Immunologic: Negative. Neurological: Negative. Hematological: Negative. Psychiatric/Behavioral: The patient is nervous/anxious (stressful time in life, but better control). She has been smoking more recently. Physical Exam  PE was not done today as this was a telephone conference visit. ASSESSMENT      ICD-10-CM    1. Encounter for screening mammogram for malignant neoplasm of breast  Z12.31 KACI DIGITAL SCREEN W OR WO CAD BILATERAL   2. Generalized anxiety disorder  F41.1 clonazePAM (KLONOPIN) 1 MG tablet         PLAN    1. Generalized anxiety disorder  She is doing very well with clonazepam.  This has been very effective over time. We will continue the same. - clonazePAM (KLONOPIN) 1 MG tablet; Take 1 tablet by mouth 3 times daily as needed for Anxiety for up to 30 days. Dispense: 90 tablet; Refill: 0      Orders Placed This Encounter   Procedures    KACI DIGITAL SCREEN W OR WO CAD BILATERAL        Return in about 1 month (around 3/8/2022) for 15.      She is also needing a Gyn eval/ Pap  We will set her up with one of our female providers. John Haque, was evaluated through a synchronous (real-time) audio-video encounter. The patient (or guardian if applicable) is aware that this is a billable service, which includes applicable co-pays. This Virtual Visit was conducted with patient's (and/or legal guardian's) consent. The visit was conducted pursuant to the emergency declaration under the [address] [place] [address] authority and the [employer] General Act. Patient identification was verified, and a caregiver was present when appropriate. The patient was located at home in a state where the provider was licensed to provide care. Total time spent for this encounter: 15m    --LETA Ramos DO on 2/8/2022 at 5:10 PM    An electronic signature was used to authenticate this note.

## 2022-02-08 NOTE — PATIENT INSTRUCTIONS
Patient Education        Learning About Anxiety Disorders  What are anxiety disorders? Anxiety disorders are a type of medical problem. They cause severe anxiety. When you feel anxious, you feel that something bad is about to happen. This feeling interferes with your life. These disorders include:  · Generalized anxiety disorder. You feel worried and stressed about many everyday events and activities. This goes on for several months and disrupts your life on most days. · Panic disorder. You have repeated panic attacks. A panic attack is a sudden, intense fear or anxiety. It may make you feel short of breath. Your heart may pound. · Social anxiety disorder. You feel very anxious about what you will say or do in front of people. For example, you may be scared to talk or eat in public. This problem affects your daily life. · Phobias. You are very scared of a specific object, situation, or activity. For example, you may fear spiders, high places, or small spaces. What are the symptoms? Generalized anxiety disorder  Symptoms may include:  · Feeling worried and stressed about many things almost every day. · Feeling tired or irritable. You may have a hard time concentrating. · Having headaches or muscle aches. · Having a hard time getting to sleep or staying asleep. Panic disorder  You may have repeated panic attacks when there is no reason for feeling afraid. You may change your daily activities because you worry that you will have another attack. Symptoms may include:  · Intense fear, terror, or anxiety. · Trouble breathing or very fast breathing. · Chest pain or tightness. · A heartbeat that races or is not regular. Social anxiety disorder  Symptoms may include:  · Fear about a social situation, such as eating in front of others or speaking in public. You may worry a lot. Or you may be afraid that something bad will happen. · Anxiety that can cause you to blush, sweat, and feel shaky.   · A heartbeat that is faster than normal.  · A hard time focusing. Phobias  Symptoms may include:  · More fear than most people of being around an object, being in a situation, or doing an activity. You might also be stressed about the chance of being around the thing you fear. · Worry about losing control, panicking, fainting, or having physical symptoms like a faster heartbeat when you are around the situation or object. How are these disorders treated? Anxiety disorders can be treated with medicines or counseling. A combination of both may be used. Medicines may include:  · Antidepressants. These may help your symptoms by keeping chemicals in your brain in balance. · Benzodiazepines. These may give you short-term relief of your symptoms. Some people use cognitive-behavioral therapy. A therapist helps you learn to change stressful or bad thoughts into helpful thoughts. Lead a healthy lifestyle  A healthy lifestyle may help you feel better. · Get at least 30 minutes of exercise on most days of the week. Walking is a good choice. · Eat a healthy diet. Include fruits, vegetables, lean proteins, and whole grains in your diet each day. · Try to go to bed at the same time every night. Try for 8 hours of sleep a night. · Find ways to manage stress. Try relaxation exercises. · Avoid alcohol and illegal drugs. Follow-up care is a key part of your treatment and safety. Be sure to make and go to all appointments, and call your doctor if you are having problems. It's also a good idea to know your test results and keep a list of the medicines you take. Where can you learn more? Go to [email or web address] org and sign in to your [employer] account. Enter O931 in the TextCorner box to learn more about \"Learning About Anxiety Disorders. \"     If you do not have an account, please click on the \"Sign Up Now\" link.   Current as of: June 16, 2021               Content Version: 13.1  © 9070-3215 Healthwise, Incorporated. Care instructions adapted under license by [facility] ([facility]). If you have questions about a medical condition or this instruction, always ask your healthcare professional. [address] any warranty or liability for your use of this information.

## 2022-02-13 ENCOUNTER — PATIENT MESSAGE (OUTPATIENT)
Dept: PRIMARY CARE CLINIC | Age: 52
End: 2022-02-13

## 2022-02-14 NOTE — TELEPHONE ENCOUNTER
From: Benjamin Landin  To: Dr. Eduin Mayen: 2/13/2022 8:26 PM CST  Subject: New medication     Hi Dr. Blessing Jackson   Would you please be able to call in this prescription I used to get in [place]   For cold sores. I sometimes get them prior to the week of my period yet havent gotten one in over 2 years. I am starting to feel one and this pills took them away before they even came out bad. Worked very well. Elisa 11. Thanks so much.    Milena

## 2022-02-15 RX ORDER — VALACYCLOVIR HYDROCHLORIDE 1 G/1
TABLET, FILM COATED ORAL
Qty: 4 TABLET | Refills: 3 | Status: SHIPPED | OUTPATIENT
Start: 2022-02-15 | End: 2022-09-27

## 2022-02-23 ENCOUNTER — HOSPITAL ENCOUNTER (OUTPATIENT)
Dept: WOMENS IMAGING | Age: 52
Discharge: HOME | End: 2022-02-23
Payer: COMMERCIAL

## 2022-02-23 DIAGNOSIS — Z12.31 ENCOUNTER FOR SCREENING MAMMOGRAM FOR MALIGNANT NEOPLASM OF BREAST: ICD-10-CM

## 2022-02-23 PROCEDURE — 77063 BREAST TOMOSYNTHESIS BI: CPT

## 2022-03-02 ENCOUNTER — OFFICE VISIT (OUTPATIENT)
Dept: FAMILY MEDICINE CLINIC | Age: 52
End: 2022-03-02
Payer: COMMERCIAL

## 2022-03-02 VITALS
DIASTOLIC BLOOD PRESSURE: 72 MMHG | TEMPERATURE: 97 F | BODY MASS INDEX: 21.93 KG/M2 | OXYGEN SATURATION: 99 % | HEART RATE: 90 BPM | SYSTOLIC BLOOD PRESSURE: 132 MMHG | WEIGHT: 140 LBS

## 2022-03-02 DIAGNOSIS — Z01.419 PAP SMEAR, AS PART OF ROUTINE GYNECOLOGICAL EXAMINATION: Primary | ICD-10-CM

## 2022-03-02 PROCEDURE — 99396 PREV VISIT EST AGE 40-64: CPT | Performed by: NURSE PRACTITIONER

## 2022-03-02 PROCEDURE — G8484 FLU IMMUNIZE NO ADMIN: HCPCS | Performed by: NURSE PRACTITIONER

## 2022-03-02 ASSESSMENT — ENCOUNTER SYMPTOMS: RESPIRATORY NEGATIVE: 1

## 2022-03-02 NOTE — PROGRESS NOTES
SUBJECTIVE:    Patient ID: Mortimer Dustman is a50 y.o. female. Mortimer Dustman is here today for Gynecologic Exam  .    HPI:   HPI       Pt is here today for pap. Pt has had last year's pap with Dr. Jewels Fair. States that pap was wnl. She does state that she was dx with cervical cancer and required surgical biopsy and no further treatment. I do not see these records and we will try to obtain these for her chart. She does report that her menstrual cycle is now very regular, more regular than it has been in years. She is very much looking forward to menses ending. She is unaware of what age her mother would have went through menopause as she did have a hysterectomy at 46 and knows that she was still having periods at that time prior. Does state history of colon cancer and is up-to-date at this time on colonoscopy. Has had hemorrhoid excision in years past.  Program was just done within the last week and a half and is normal.    Narrative   EXAMINATION: KACI DIGITAL SCREEN W OR WO CAD BILATERAL 2/28/2022 12:12   PM   HISTORY: SCREENING BILATERAL DIGITAL MAMMOGRAM WITH TOMOSYNTHESIS   2/23/2022 2:24 PM   CLINICAL HISTORY: Screening.     COMPARISON STUDIES: April 29, 2020. FINDINGS:    Digital CC and MLO views of bilateral breasts were obtained. Tomosynthesis in the MLO and CC projections was also performed. The breast tissue is heterogeneously dense (approximately 50-75%   glandular tissue) consistent with a type C parenchymal pattern,   limiting the sensitivity of mammography. No suspicious masses or   calcifications are identified. A biopsy clip is present in the left   breast. This study was interpreted with CAD. IMPRESSION AND RECOMMENDATION:    No mammographic evidence of malignancy. Recommendation is for the   patient to return for routine mammography in one year or sooner, if   clinically indicated.     Assessment: BI-RADS Category 2 benign    Signed by Dr Maxwell Finley     Past Medical History:   Diagnosis Date    Insomnia     Malignant neoplasm of endocervix ([address]     reported by pt that she had cone bx and no further tx needed     Prior to Visit Medications    Medication Sig Taking? Authorizing Provider   valACYclovir (VALTREX) 1 g tablet Take 2 tablets every 12 hours at first onset of cold sore. Yes B Vic King Salmon, DO   clonazePAM (KLONOPIN) 1 MG tablet Take 1 tablet by mouth 3 times daily as needed for Anxiety for up to 30 days.  Yes B Vic King [place]mon, DO   escitalopram (LEXAPRO) 20 MG tablet Take 1.5 tablets by mouth daily Yes ZAKI Howard     Allergies   Allergen Reactions    Sulfa Antibiotics Hives     Past Surgical History:   Procedure Laterality Date    BREAST BIOPSY Left 2017    neg    COLONOSCOPY      HEMORRHOID SURGERY  01/2018    TUBAL LIGATION       Family History   Problem Relation Age of Onset    Ulcerative Colitis Mother     High Blood Pressure Father     High Blood Pressure Brother     Crohn's Disease Maternal Aunt     Ulcerative Colitis Paternal Aunt     Ulcerative Colitis Paternal Uncle     Colon Cancer Paternal Grandfather      Social History     Socioeconomic History    Marital status:      Spouse name: Not on file    Number of children: Not on file    Years of education: Not on file    Highest education level: Not on file   Occupational History    Not on file   Tobacco Use    Smoking status: Current Some Day Smoker     Packs/day: 0.10     Years: 25.00     Pack years: 2.50     Types: Cigarettes    Smokeless tobacco: Never Used    Tobacco comment: just a social smoker   Vaping Use    Vaping Use: Former   Substance and Sexual Activity    Alcohol use: Yes     Comment: socailly    Drug use: Not on file    Sexual activity: Not on file   Other Topics Concern    Not on file   Social History Narrative    Not on file     Social Determinants of Health     Financial Resource Strain:     Difficulty of Paying Living Expenses: Not on file   Food Insecurity:     Worried About Running Out of Food in the Last Year: Not on file    Nitesh of Food in the Last Year: Not on file   Transportation Needs:     Lack of Transportation (Medical): Not on file    Lack of Transportation (Non-Medical): Not on file   Physical Activity:     Days of Exercise per Week: Not on file    Minutes of Exercise per Session: Not on file   Stress:     Feeling of Stress : Not on file   Social Connections:     Frequency of Communication with Friends and Family: Not on file    Frequency of Social Gatherings with Friends and Family: Not on file    Attends Alevism Services: Not on file    Active Member of [address] [place] [address] [employer] or Organizations: Not on file    Attends Club or Organization Meetings: Not on file    Marital Status: Not on file   Intimate Partner Violence:     Fear of Current or Ex-Partner: Not on file    Emotionally Abused: Not on file    Physically Abused: Not on file    Sexually Abused: Not on file   Housing Stability:     Unable to Pay for Housing in the Last Year: Not on file    Number of [place] in the Last Year: Not on file    Unstable Housing in the Last Year: Not on file       Review of Systems   Constitutional: Negative for unexpected weight change. Respiratory: Negative. Cardiovascular: Negative. Genitourinary: Negative for menstrual problem. OBJECTIVE:    Physical Exam  Vitals and nursing note reviewed. Constitutional:       General: She is not in acute distress. Appearance: She is well-developed. She is not ill-appearing or diaphoretic. HENT:      Head: Normocephalic and atraumatic. Right Ear: External ear normal.      Left Ear: External ear normal.      Nose: Nose normal.      Mouth/Throat:      Pharynx: No oropharyngeal exudate. Eyes:      Extraocular Movements: Extraocular movements intact. Conjunctiva/sclera: Conjunctivae normal.      Pupils: Pupils are equal, round, and reactive to light. Neck:      Thyroid: No thyromegaly. Vascular: No carotid bruit or JVD. Trachea: Trachea normal. No tracheal deviation. Cardiovascular:      Rate and Rhythm: Normal rate and regular rhythm. Heart sounds: Normal heart sounds. Pulmonary:      Effort: Pulmonary effort is normal. No respiratory distress. Breath sounds: Normal breath sounds. Chest:   Breasts:      Right: No inverted nipple, mass, nipple discharge, skin change, tenderness, axillary adenopathy or supraclavicular adenopathy. Left: No inverted nipple, mass, nipple discharge, skin change, tenderness, axillary adenopathy or supraclavicular adenopathy. Abdominal:      General: Bowel sounds are normal. There is no distension. Palpations: Abdomen is soft. Tenderness: There is no abdominal tenderness. Genitourinary:     Exam position: Supine. Labia:         Right: No rash, tenderness, lesion or injury. Left: No rash, tenderness, lesion or injury. Vagina: Normal. No vaginal discharge or bleeding. Cervix: No cervical motion tenderness, discharge or friability. Adnexa:         Right: No mass, tenderness or fullness. Left: No mass, tenderness or fullness. Rectum: Normal. Guaiac stool: will defer with cscope done. Normal anal tone. Musculoskeletal:         General: Normal range of motion. Cervical back: Neck supple. No rigidity. Lymphadenopathy:      Cervical: No cervical adenopathy. Upper Body:      Right upper body: No supraclavicular or axillary adenopathy. Left upper body: No supraclavicular or axillary adenopathy. Skin:     General: Skin is warm and dry. Capillary Refill: Capillary refill takes less than 2 seconds. Neurological:      General: No focal deficit present. Mental Status: She is alert and oriented to person, place, and time. Mental status is at baseline.    Psychiatric:         Mood and Affect: Mood normal. Behavior: Behavior normal.         Thought Content: Thought content normal.         Judgment: Judgment normal.         /72 (Site: Right Upper Arm, Position: Sitting, Cuff Size: Medium Adult)   Pulse 90   Temp 97 °F (36.1 °C) (Temporal)   Wt 140 lb (63.5 kg)   SpO2 99%   BMI 21.93 kg/m²      ASSESSMENT:      ICD-10-CM    1. Pap smear, as part of routine gynecological examination  Z01.419 Human papillomavirus (HPV) DNA probe thin prep high risk     PAP SMEAR       PLAN:    Milena Greenberg: Gynecologic Exam  Records from previous GYN in [place] (dx of cervical cancer)  Pap today  Keep f/u with pcp. Diagnosis and orders for this visit are above. Please note that this chart was generated using dragon dictationsoftware. Although every effort was made to ensure the accuracy of this automated transcription, some errors in transcription may have occurred.

## 2022-03-08 ENCOUNTER — TELEMEDICINE (OUTPATIENT)
Dept: PRIMARY CARE CLINIC | Age: 52
End: 2022-03-08
Payer: COMMERCIAL

## 2022-03-08 DIAGNOSIS — F41.1 GENERALIZED ANXIETY DISORDER: ICD-10-CM

## 2022-03-08 PROCEDURE — 3017F COLORECTAL CA SCREEN DOC REV: CPT | Performed by: PEDIATRICS

## 2022-03-08 PROCEDURE — 99213 OFFICE O/P EST LOW 20 MIN: CPT | Performed by: PEDIATRICS

## 2022-03-08 PROCEDURE — G8427 DOCREV CUR MEDS BY ELIG CLIN: HCPCS | Performed by: PEDIATRICS

## 2022-03-08 RX ORDER — CLONAZEPAM 1 MG/1
1 TABLET ORAL 3 TIMES DAILY PRN
Qty: 90 TABLET | Refills: 0 | Status: SHIPPED | OUTPATIENT
Start: 2022-03-08 | End: 2022-04-07 | Stop reason: SDUPTHER

## 2022-03-08 SDOH — ECONOMIC STABILITY: FOOD INSECURITY: WITHIN THE PAST 12 MONTHS, THE FOOD YOU BOUGHT JUST DIDN'T LAST AND YOU DIDN'T HAVE MONEY TO GET MORE.: NEVER TRUE

## 2022-03-08 SDOH — ECONOMIC STABILITY: FOOD INSECURITY: WITHIN THE PAST 12 MONTHS, YOU WORRIED THAT YOUR FOOD WOULD RUN OUT BEFORE YOU GOT MONEY TO BUY MORE.: NEVER TRUE

## 2022-03-08 ASSESSMENT — ENCOUNTER SYMPTOMS
ALLERGIC/IMMUNOLOGIC NEGATIVE: 1
RESPIRATORY NEGATIVE: 1
GASTROINTESTINAL NEGATIVE: 1
EYES NEGATIVE: 1

## 2022-03-08 ASSESSMENT — SOCIAL DETERMINANTS OF HEALTH (SDOH): HOW HARD IS IT FOR YOU TO PAY FOR THE VERY BASICS LIKE FOOD, HOUSING, MEDICAL CARE, AND HEATING?: NOT HARD AT ALL

## 2022-03-08 NOTE — PROGRESS NOTES
[facility] [address] [place], [address]  Phone [phone]   Fax [phone]      OFFICE VISIT: 3/8/2022    Carie Greenberg-: 1970      HPI  Reason For Visit:  Milena is a 46 y.o.     1 Month Follow-Up (klonopin working well for anxiety, no concerns needing refills. ) and Medication Refill (klonopin)    Patient presents via doxy. me video conferencing on follow-up for chronic anxiety. She typically takes clonazepam 1 mg on a weekly basis for her anxiety. This medication is effective at managing her anxiety. She typically does take up to 3 a day to control her symptoms. Last prescription of clonazepam 1 mg was on 2022 for number 90 tablets  Adjunct medications:              Lexapro 20 mg daily              Rexulti 1 mg daily              Gabapentin 100 mg 3 times daily  Symptoms: Anxiety is controlled to functional state.     WILLA was reviewed today per office protocol. Report shows No discrepancies.  Fill pattern is consistent from single provider(s) at single pharmacy(s). Request # [id number]   vitals were not taken for this visit. There is no height or weight on file to calculate BMI. I have reviewed the following with the Ms. Greenberg   Lab Review  Office Visit on 2022   Component Date Value    HPV Comment 2022 See below    Office Visit on 2022   Component Date Value    Influenza A Ab 2022 neg     Influenza B Ab 2022 neg     Strep A Ag 2022 None Detected     SARS-CoV-2, PCR 2022 Not Detected      Copies of these are in the chart. Current Outpatient Medications   Medication Sig Dispense Refill    clonazePAM (KLONOPIN) 1 MG tablet Take 1 tablet by mouth 3 times daily as needed for Anxiety for up to 30 days. 90 tablet 0    valACYclovir (VALTREX) 1 g tablet Take 2 tablets every 12 hours at first onset of cold sore.  4 tablet 3    escitalopram (LEXAPRO) 20 MG tablet Take 1.5 tablets by mouth daily 135 tablet 3 No current facility-administered medications for this visit. Allergies: Sulfa antibiotics     Past Medical History:   Diagnosis Date    Insomnia     Malignant neoplasm of endocervix ([address]     reported by pt that she had cone bx and no further tx needed       Family History   Problem Relation Age of Onset    Ulcerative Colitis Mother     High Blood Pressure Father     High Blood Pressure Brother     Crohn's Disease Maternal Aunt     Ulcerative Colitis Paternal Aunt     Ulcerative Colitis Paternal Uncle     Colon Cancer Paternal Grandfather        Past Surgical History:   Procedure Laterality Date    BREAST BIOPSY Left 2017    neg    COLONOSCOPY      HEMORRHOID SURGERY  01/2018    TUBAL LIGATION         Social History     Tobacco Use    Smoking status: Current Some Day Smoker     Packs/day: 0.10     Years: 25.00     Pack years: 2.50     Types: Cigarettes    Smokeless tobacco: Never Used    Tobacco comment: just a social smoker   Substance Use Topics    Alcohol use: Yes     Comment: socailly        Review of Systems   Constitutional: Negative. HENT: Negative. Eyes: Negative. Respiratory: Negative. Cardiovascular: Negative. Gastrointestinal: Negative. Endocrine: Negative. Genitourinary: Negative. Musculoskeletal: Negative. Skin: Negative. Allergic/Immunologic: Negative. Neurological: Negative. Hematological: Negative. Psychiatric/Behavioral: The patient is nervous/anxious (stressful time in life, but better control). She has been smoking more recently. Physical Exam  Physical exam was not performed today as this was a video teleconference visit using Doxy. Me      ASSESSMENT      ICD-10-CM    1. Generalized anxiety disorder  F41.1 clonazePAM (KLONOPIN) 1 MG tablet         PLAN    1. Generalized anxiety disorder  Doing very well on this medication regimen. We will continue the same  - clonazePAM (KLONOPIN) 1 MG tablet;  Take 1 tablet by mouth 3 times daily as needed for Anxiety for up to 30 days. Dispense: 90 tablet; Refill: 0      No orders of the defined types were placed in this encounter. Return in about 1 month (around 4/8/2022) for Alessandro Susu Ramses, was evaluated through a synchronous (real-time) audio-video encounter. The patient (or guardian if applicable) is aware that this is a billable service, which includes applicable co-pays. This Virtual Visit was conducted with patient's (and/or legal guardian's) consent. The visit was conducted pursuant to the emergency declaration under the [address] [place] [address], [address] [place] [address] authority and the [employer] General Act. Patient identification was verified, and a caregiver was present when appropriate. The patient was located at home in a state where the provider was licensed to provide care. Total time spent for this encounter: 20m    --LETA Cope DO on 3/8/2022 at 5:48 PM    An electronic signature was used to authenticate this note.

## 2022-03-08 NOTE — PATIENT INSTRUCTIONS
Patient Education        Anxiety Disorder: Care Instructions  Your Care Instructions     Anxiety is a normal reaction to stress. Difficult situations can cause you to have symptoms such as sweaty palms and a nervous feeling. In an anxiety disorder, the symptoms are far more severe. Constant worry, muscle tension, trouble sleeping, nausea and diarrhea, and other symptoms can make normal daily activities difficult or impossible. These symptoms may occur for no reason, and they can affect your work, school, or social life. Medicines, counseling, and self-care can all help. Follow-up care is a key part of your treatment and safety. Be sure to make and go to all appointments, and call your doctor if you are having problems. It's also a good idea to know your test results and keep a list of the medicines you take. How can you care for yourself at home? · Take medicines exactly as directed. Call your doctor if you think you are having a problem with your medicine. · Go to your counseling sessions and follow-up appointments. · Recognize and accept your anxiety. Then, when you are in a situation that makes you anxious, say to yourself, \"This is not an emergency. I feel uncomfortable, but I am not in danger. I can keep going even if I feel anxious. \"  · Be kind to your body:  ? Relieve tension with exercise or a massage. ? Get enough rest.  ? Avoid alcohol, caffeine, nicotine, and illegal drugs. They can increase your anxiety level and cause sleep problems. ? Learn and do relaxation techniques. See below for more about these techniques. · Engage your mind. Get out and do something you enjoy. Go to a funny movie, or take a walk or hike. Plan your day. Having too much or too little to do can make you anxious. · Keep a record of your symptoms. Discuss your fears with a good friend or family member, or join a support group for people with similar problems. Talking to others sometimes relieves stress.   · Get involved in social groups, or volunteer to help others. Being alone sometimes makes things seem worse than they are. · Get at least 30 minutes of exercise on most days of the week to relieve stress. Walking is a good choice. You also may want to do other activities, such as running, swimming, cycling, or playing tennis or team sports. Relaxation techniques  Do relaxation exercises 10 to 20 minutes a day. You can play soothing, relaxing music while you do them, if you wish. · Tell others in your house that you are going to do your relaxation exercises. Ask them not to disturb you. · Find a comfortable place, away from all distractions and noise. · Lie down on your back, or sit with your back straight. · Focus on your breathing. Make it slow and steady. · Breathe in through your nose. Breathe out through either your nose or mouth. · Breathe deeply, filling up the area between your navel and your rib cage. Breathe so that your belly goes up and down. · Do not hold your breath. · Breathe like this for 5 to 10 minutes. Notice the feeling of calmness throughout your whole body. As you continue to breathe slowly and deeply, relax by doing the following for another 5 to 10 minutes:  · Tighten and relax each muscle group in your body. You can begin at your toes and work your way up to your head. · Imagine your muscle groups relaxing and becoming heavy. · Empty your mind of all thoughts. · Let yourself relax more and more deeply. · Become aware of the state of calmness that surrounds you. · When your relaxation time is over, you can bring yourself back to alertness by moving your fingers and toes and then your hands and feet and then stretching and moving your entire body. Sometimes people fall asleep during relaxation, but they usually wake up shortly afterward. · Always give yourself time to return to full alertness before you drive a car or do anything that might cause an accident if you are not fully alert.  Never play a relaxation tape while you drive a car. When should you call for help? Call 911 anytime you think you may need emergency care. For example, call if:    · You feel you cannot stop from hurting yourself or someone else. Keep the numbers for these national suicide hotlines: 9-154-479-TALK (5-407.331.6337) and 2-311-JAZBSOM (5-178.934.6595). If you or someone you know talks about suicide or feeling hopeless, get help right away. Watch closely for changes in your health, and be sure to contact your doctor if:    · You have anxiety or fear that affects your life.     · You have symptoms of anxiety that are new or different from those you had before. Where can you learn more? Go to https://Once Innovations.CorTechs Labs. org and sign in to your Cnano Technology account. Enter P754 in the Identiv box to learn more about \"Anxiety Disorder: Care Instructions. \"     If you do not have an account, please click on the \"Sign Up Now\" link. Current as of: September 23, 2020               Content Version: 12.9  © 2006-2021 eReceipts. Care instructions adapted under license by [facility] [place] ([facility]). If you have questions about a medical condition or this instruction, always ask your healthcare professional. [place] [address] any warranty or liability for your use of this information. Patient Education        Learning About Anxiety Disorders  What are anxiety disorders? Anxiety disorders are a type of medical problem. They cause severe anxiety. When you feel anxious, you feel that something bad is about to happen. This feeling interferes with your life. These disorders include:  · Generalized anxiety disorder. You feel worried and stressed about many everyday events and activities. This goes on for several months and disrupts your life on most days. · Panic disorder. You have repeated panic attacks. A panic attack is a sudden, intense fear or anxiety.  It may make you feel short of breath. Your heart may pound. · Social anxiety disorder. You feel very anxious about what you will say or do in front of people. For example, you may be scared to talk or eat in public. This problem affects your daily life. · Phobias. You are very scared of a specific object, situation, or activity. For example, you may fear spiders, high places, or small spaces. What are the symptoms? Generalized anxiety disorder  Symptoms may include:  · Feeling worried and stressed about many things almost every day. · Feeling tired or irritable. You may have a hard time concentrating. · Having headaches or muscle aches. · Having a hard time getting to sleep or staying asleep. Panic disorder  You may have repeated panic attacks when there is no reason for feeling afraid. You may change your daily activities because you worry that you will have another attack. Symptoms may include:  · Intense fear, terror, or anxiety. · Trouble breathing or very fast breathing. · Chest pain or tightness. · A heartbeat that races or is not regular. Social anxiety disorder  Symptoms may include:  · Fear about a social situation, such as eating in front of others or speaking in public. You may worry a lot. Or you may be afraid that something bad will happen. · Anxiety that can cause you to blush, sweat, and feel shaky. · A heartbeat that is faster than normal.  · A hard time focusing. Phobias  Symptoms may include:  · More fear than most people of being around an object, being in a situation, or doing an activity. You might also be stressed about the chance of being around the thing you fear. · Worry about losing control, panicking, fainting, or having physical symptoms like a faster heartbeat when you are around the situation or object. How are these disorders treated? Anxiety disorders can be treated with medicines or counseling. A combination of both may be used. Medicines may include:  · Antidepressants.  These may help your symptoms by keeping chemicals in your brain in balance. · Benzodiazepines. These may give you short-term relief of your symptoms. Some people use cognitive-behavioral therapy. A therapist helps you learn to change stressful or bad thoughts into helpful thoughts. Lead a healthy lifestyle  A healthy lifestyle may help you feel better. · Get at least 30 minutes of exercise on most days of the week. Walking is a good choice. · Eat a healthy diet. Include fruits, vegetables, lean proteins, and whole grains in your diet each day. · Try to go to bed at the same time every night. Try for 8 hours of sleep a night. · Find ways to manage stress. Try relaxation exercises. · Avoid alcohol and illegal drugs. Follow-up care is a key part of your treatment and safety. Be sure to make and go to all appointments, and call your doctor if you are having problems. It's also a good idea to know your test results and keep a list of the medicines you take. Where can you learn more? Go to https://Trice MedicalpeListia.PerSer Corp. org and sign in to your Bioscale account. Enter Z223 in the Animail box to learn more about \"Learning About Anxiety Disorders. \"     If you do not have an account, please click on the \"Sign Up Now\" link. Current as of: June 16, 2021               Content Version: 13.1  © 9328-8234 Healthwise, Incorporated. Care instructions adapted under license by [facility] ([facility]). If you have questions about a medical condition or this instruction, always ask your healthcare professional. [place] [address] any warranty or liability for your use of this information.

## 2022-03-09 LAB
HPV COMMENT: NORMAL
HPV TYPE 16: NOT DETECTED
HPV TYPE 18: NOT DETECTED
HPVOH (OTHER TYPES): NOT DETECTED

## 2022-04-04 ENCOUNTER — OFFICE VISIT (OUTPATIENT)
Dept: PRIMARY CARE CLINIC | Age: 52
End: 2022-04-04
Payer: COMMERCIAL

## 2022-04-04 VITALS
BODY MASS INDEX: 22.35 KG/M2 | HEART RATE: 96 BPM | SYSTOLIC BLOOD PRESSURE: 110 MMHG | TEMPERATURE: 98.6 F | HEIGHT: 67 IN | DIASTOLIC BLOOD PRESSURE: 70 MMHG | WEIGHT: 142.4 LBS | OXYGEN SATURATION: 93 %

## 2022-04-04 DIAGNOSIS — N10 ACUTE PYELONEPHRITIS: Primary | ICD-10-CM

## 2022-04-04 DIAGNOSIS — R50.9 FEVER, UNSPECIFIED FEVER CAUSE: ICD-10-CM

## 2022-04-04 DIAGNOSIS — R35.0 FREQUENT URINATION: ICD-10-CM

## 2022-04-04 LAB
APPEARANCE FLUID: ABNORMAL
BILIRUBIN, POC: ABNORMAL
BLOOD URINE, POC: NEGATIVE
CLARITY, POC: ABNORMAL
COLOR, POC: ABNORMAL
GLUCOSE URINE, POC: ABNORMAL
KETONES, POC: NEGATIVE
LEUKOCYTE EST, POC: ABNORMAL
NITRITE, POC: POSITIVE
PH, POC: 5
PROTEIN, POC: ABNORMAL
SPECIFIC GRAVITY, POC: 1.01
UROBILINOGEN, POC: ABNORMAL

## 2022-04-04 PROCEDURE — 81002 URINALYSIS NONAUTO W/O SCOPE: CPT | Performed by: NURSE PRACTITIONER

## 2022-04-04 PROCEDURE — 4004F PT TOBACCO SCREEN RCVD TLK: CPT | Performed by: NURSE PRACTITIONER

## 2022-04-04 PROCEDURE — G8420 CALC BMI NORM PARAMETERS: HCPCS | Performed by: NURSE PRACTITIONER

## 2022-04-04 PROCEDURE — 99214 OFFICE O/P EST MOD 30 MIN: CPT | Performed by: NURSE PRACTITIONER

## 2022-04-04 PROCEDURE — 96372 THER/PROPH/DIAG INJ SC/IM: CPT | Performed by: NURSE PRACTITIONER

## 2022-04-04 PROCEDURE — G8427 DOCREV CUR MEDS BY ELIG CLIN: HCPCS | Performed by: NURSE PRACTITIONER

## 2022-04-04 PROCEDURE — 3017F COLORECTAL CA SCREEN DOC REV: CPT | Performed by: NURSE PRACTITIONER

## 2022-04-04 RX ORDER — PHENAZOPYRIDINE HYDROCHLORIDE 200 MG/1
200 TABLET, FILM COATED ORAL 3 TIMES DAILY PRN
Qty: 12 TABLET | Refills: 0 | Status: SHIPPED | OUTPATIENT
Start: 2022-04-04 | End: 2022-04-07 | Stop reason: SDUPTHER

## 2022-04-04 RX ORDER — CEFTRIAXONE 1 G/1
1000 INJECTION, POWDER, FOR SOLUTION INTRAMUSCULAR; INTRAVENOUS ONCE
Status: COMPLETED | OUTPATIENT
Start: 2022-04-04 | End: 2022-04-04

## 2022-04-04 RX ORDER — CIPROFLOXACIN 500 MG/1
500 TABLET, FILM COATED ORAL 2 TIMES DAILY
Qty: 20 TABLET | Refills: 0 | Status: SHIPPED | OUTPATIENT
Start: 2022-04-04 | End: 2022-04-14

## 2022-04-04 RX ADMIN — CEFTRIAXONE 1000 MG: 1 INJECTION, POWDER, FOR SOLUTION INTRAMUSCULAR; INTRAVENOUS at 10:36

## 2022-04-04 ASSESSMENT — ENCOUNTER SYMPTOMS
COUGH: 0
EYE REDNESS: 0
ABDOMINAL PAIN: 0
DIARRHEA: 0
CONSTIPATION: 0
EYE DISCHARGE: 0
BLOOD IN STOOL: 0
TROUBLE SWALLOWING: 0
RHINORRHEA: 0
SORE THROAT: 0
WHEEZING: 0
NAUSEA: 1

## 2022-04-04 NOTE — PROGRESS NOTES
Virginia King (:  1970) is a 46 y.o. female,Established patient, here for evaluation of the following chief complaint(s):  Back Pain (Painful urination, lower back and on the sides and also claims pain will wraps arounf to front where ovaries are at ) and Fever      ASSESSMENT/PLAN:    ICD-10-CM    1. Acute pyelonephritis  N10 ciprofloxacin (CIPRO) 500 MG tablet     phenazopyridine (PYRIDIUM) 200 MG tablet     cefTRIAXone (ROCEPHIN) injection 1,000 mg   2. Frequent urination  R35.0 POCT Urinalysis no Micro   3. Fever, unspecified fever cause  R50.9      Follow up if not improving, symptoms worsen, or if fever, chills, or lower back pain develop. Return if symptoms worsen or fail to improve. SUBJECTIVE/OBJECTIVE:  Dysuria   This is a new problem. The current episode started in the past 7 days. The problem occurs every urination. The problem has been gradually worsening. The quality of the pain is described as aching and burning. The pain is moderate. Associated symptoms include frequency, nausea and urgency. Pertinent negatives include no possible pregnancy. She has tried increased fluids, acetaminophen and NSAIDs for the symptoms. Review of Systems   Constitutional: Positive for fever. Negative for appetite change and unexpected weight change. HENT: Negative for congestion, rhinorrhea, sore throat and trouble swallowing. Eyes: Negative for discharge and redness. Respiratory: Negative for cough and wheezing. Cardiovascular: Negative for chest pain. Gastrointestinal: Positive for nausea. Negative for abdominal pain, blood in stool, constipation and diarrhea. Genitourinary: Positive for dysuria, frequency and urgency. Skin: Negative for rash. Neurological: Negative for weakness. Hematological: Negative for adenopathy.        /70   Pulse 96   Temp 98.6 °F (37 °C)   Ht 5' 7\" (1.702 m)   Wt 142 lb 6.4 oz (64.6 kg)   SpO2 93%   BMI 22.30 kg/m²    Physical Exam  Vitals reviewed. Constitutional:       Appearance: She is well-developed. HENT:      Head: Normocephalic. Eyes:      Conjunctiva/sclera: Conjunctivae normal.   Cardiovascular:      Rate and Rhythm: Normal rate. Pulmonary:      Effort: Pulmonary effort is normal.   Musculoskeletal:         General: Normal range of motion. Cervical back: Normal range of motion and neck supple. Skin:     General: Skin is warm and dry. Neurological:      Mental Status: She is alert and oriented to person, place, and time. Psychiatric:         Behavior: Behavior normal.                 An electronic signature was used to authenticate this note.     --Bogdan Alvarez, ZAKI

## 2022-04-07 ENCOUNTER — TELEMEDICINE (OUTPATIENT)
Dept: PRIMARY CARE CLINIC | Age: 52
End: 2022-04-07
Payer: COMMERCIAL

## 2022-04-07 ENCOUNTER — PATIENT MESSAGE (OUTPATIENT)
Dept: PRIMARY CARE CLINIC | Age: 52
End: 2022-04-07

## 2022-04-07 DIAGNOSIS — K62.5 RECTAL BLEEDING: ICD-10-CM

## 2022-04-07 DIAGNOSIS — F41.1 GENERALIZED ANXIETY DISORDER: Primary | ICD-10-CM

## 2022-04-07 DIAGNOSIS — N30.01 ACUTE CYSTITIS WITH HEMATURIA: ICD-10-CM

## 2022-04-07 DIAGNOSIS — B37.31 YEAST VAGINITIS: ICD-10-CM

## 2022-04-07 PROCEDURE — 3017F COLORECTAL CA SCREEN DOC REV: CPT | Performed by: PEDIATRICS

## 2022-04-07 PROCEDURE — G8427 DOCREV CUR MEDS BY ELIG CLIN: HCPCS | Performed by: PEDIATRICS

## 2022-04-07 PROCEDURE — 99214 OFFICE O/P EST MOD 30 MIN: CPT | Performed by: PEDIATRICS

## 2022-04-07 RX ORDER — FLUCONAZOLE 150 MG/1
150 TABLET ORAL DAILY
Qty: 3 TABLET | Refills: 0 | Status: SHIPPED | OUTPATIENT
Start: 2022-04-07 | End: 2022-04-10

## 2022-04-07 RX ORDER — CLONAZEPAM 1 MG/1
1 TABLET ORAL 3 TIMES DAILY PRN
Qty: 90 TABLET | Refills: 0 | Status: SHIPPED | OUTPATIENT
Start: 2022-04-07 | End: 2022-05-06 | Stop reason: SDUPTHER

## 2022-04-07 RX ORDER — PHENAZOPYRIDINE HYDROCHLORIDE 200 MG/1
200 TABLET, FILM COATED ORAL 3 TIMES DAILY PRN
Qty: 6 TABLET | Refills: 1 | Status: SHIPPED | OUTPATIENT
Start: 2022-04-07 | End: 2022-04-10

## 2022-04-07 ASSESSMENT — PATIENT HEALTH QUESTIONNAIRE - PHQ9
SUM OF ALL RESPONSES TO PHQ QUESTIONS 1-9: 0
2. FEELING DOWN, DEPRESSED OR HOPELESS: 0
1. LITTLE INTEREST OR PLEASURE IN DOING THINGS: 0
SUM OF ALL RESPONSES TO PHQ QUESTIONS 1-9: 0
SUM OF ALL RESPONSES TO PHQ9 QUESTIONS 1 & 2: 0

## 2022-04-07 ASSESSMENT — ENCOUNTER SYMPTOMS
RESPIRATORY NEGATIVE: 1
ALLERGIC/IMMUNOLOGIC NEGATIVE: 1
ANAL BLEEDING: 1
EYES NEGATIVE: 1

## 2022-04-07 NOTE — PATIENT INSTRUCTIONS
Patient Education        Anxiety Disorder: Care Instructions  Your Care Instructions     Anxiety is a normal reaction to stress. Difficult situations can cause you to have symptoms such as sweaty palms and a nervous feeling. In an anxiety disorder, the symptoms are far more severe. Constant worry, muscle tension, trouble sleeping, nausea and diarrhea, and other symptoms can make normal daily activities difficult or impossible. These symptoms may occur for no reason, and they can affect your work, school, or social life. Medicines, counseling, and self-care can all help. Follow-up care is a key part of your treatment and safety. Be sure to make and go to all appointments, and call your doctor if you are having problems. It's also a good idea to know your test results and keep a list of the medicines you take. How can you care for yourself at home? · Take medicines exactly as directed. Call your doctor if you think you are having a problem with your medicine. · Go to your counseling sessions and follow-up appointments. · Recognize and accept your anxiety. Then, when you are in a situation that makes you anxious, say to yourself, \"This is not an emergency. I feel uncomfortable, but I am not in danger. I can keep going even if I feel anxious. \"  · Be kind to your body:  ? Relieve tension with exercise or a massage. ? Get enough rest.  ? Avoid alcohol, caffeine, nicotine, and illegal drugs. They can increase your anxiety level and cause sleep problems. ? Learn and do relaxation techniques. See below for more about these techniques. · Engage your mind. Get out and do something you enjoy. Go to a funny movie, or take a walk or hike. Plan your day. Having too much or too little to do can make you anxious. · Keep a record of your symptoms. Discuss your fears with a good friend or family member, or join a support group for people with similar problems. Talking to others sometimes relieves stress.   · Get involved in play a relaxation tape while you drive a car. When should you call for help? Call 911 anytime you think you may need emergency care. For example, call if:    · You feel you cannot stop from hurting yourself or someone else. Keep the numbers for these national suicide hotlines: 0-213-917-TALK (2-401.850.1673) and 0-039-SADZVVT (9-307.202.9132). If you or someone you know talks about suicide or feeling hopeless, get help right away. Watch closely for changes in your health, and be sure to contact your doctor if:    · You have anxiety or fear that affects your life.     · You have symptoms of anxiety that are new or different from those you had before. Where can you learn more? Go to https://Location Based Technologies.Warp Drive Bio. org and sign in to your Cenoplex account. Enter P754 in the [employer] box to learn more about \"Anxiety Disorder: Care Instructions. \"     If you do not have an account, please click on the \"Sign Up Now\" link. Current as of: September 23, 2020               Content Version: 12.9  © 7150-3115 Raise Your Flag. Care instructions adapted under license by [facility] [place] ([facility]). If you have questions about a medical condition or this instruction, always ask your healthcare professional. [address] any warranty or liability for your use of this information. Patient Education        Rectal Bleeding: Care Instructions  Your Care Instructions     Rectal bleeding in small amounts is common. You may see red spotting on toilet paper or drops of blood in the toilet. Rectal bleeding has many possible causes, from something as minor as hemorrhoids to something as serious as coloncancer. You may need more tests to find the cause of your bleeding. Follow-up care is a key part of your treatment and safety. Be sure to make and go to all appointments, and call your doctor if you are having problems.  It's also a good idea to know your test results and keep alist of the medicines you take. How can you care for yourself at home?  Avoid aspirin and other nonsteroidal anti-inflammatory drugs (NSAIDs), such as ibuprofen (Advil, Motrin) and naproxen (Aleve). They can cause you to bleed more. Ask your doctor if you can take acetaminophen (Tylenol). Read and follow all instructions on the label.  Use a stool softener that contains bran or psyllium. You can save money by buying bran or psyllium (available in bulk at most health food stores) and sprinkling it on foods or stirring it into fruit juice. You can also use a product such as Metamucil or Citrucel.  Take your medicines exactly as directed. Call your doctor if you think you are having a problem with your medicine. When should you call for help? Call 911 anytime you think you may need emergency care. For example, call if:     You passed out (lost consciousness). Call your doctor now or seek immediate medical care if:     You have new or worse pain.      You have new or worse bleeding from the rectum.      You are dizzy or light-headed, or you feel like you may faint. Watch closely for changes in your health, and be sure to contact your doctor if:     You cannot pass stools or gas.      You do not get better as expected. Where can you learn more? Go to https://Nubleer Media.Legendary Entertainment. org and sign in to your [email or web address] account. Enter I049 in the [facility] box to learn more about \"Rectal Bleeding: Care Instructions. \"     If you do not have an account, please click on the \"Sign Up Now\" link. Current as of: September 8, 2021               Content Version: 13.2  © 6162-6429 Kreditech. Care instructions adapted under license by [facility] ([facility]). If you have questions about a medical condition or this instruction, always ask your healthcare professional. [place] [address] any warranty or liability for your use of this information.          Patient Education Candidiasis: Care Instructions  Your Care Instructions  Candidiasis (say \"wai-slo-UI-uh-rand\") is a yeast infection. Yeast normally lives in your body. But it can cause problems if your body's defenses don'twork as they should. Some medicines can increase your chance of getting a yeast infection. These include antibiotics, steroids, and cancer drugs. And some diseases like AIDSand diabetes can make you more likely to get yeast infections. There are different types of yeast infections. Willia Bending is a yeast infection in the mouth. It usually occurs in people with weakimmune systems. It causes white patches inside the mouth and throat. Yeast infections of the skin usually occur in skin folds where the skin stays moist. They cause red, oozing patches on your skin. Babies can get these infections under the diaper. Peoplewho often wear gloves can get them on their hands. Many women get vaginal yeast infections. They are most common when women take antibiotics. These infections can cause the vagina to itch and burn. They also cause white discharge that looks likecottage cheese. In rare cases, yeast infects the blood. This can cause serious disease. This kind of infection is treated with medicine given through a needle into a vein(IV). After you start treatment, a yeast infection usually goes away quickly. But if your immune system is weak, the infection may come back. Tell your doctor ifyou get yeast infections often. Follow-up care is a key part of your treatment and safety. Be sure to make and go to all appointments, and call your doctor if you are having problems. It's also a good idea to know your test results and keep alist of the medicines you take. How can you care for yourself at home?  Take your medicines exactly as prescribed. Call your doctor if you think you are having a problem with your medicine.  Use antibiotics only as directed by your doctor.  Eat yogurt with live cultures.  It has bacteria called lactobacillus. It may help prevent some types of yeast infections.  Keep your skin clean and dry. Put powder on moist places.  If you are using a cream or suppository to treat a vaginal yeast infection, don't use condoms or a diaphragm. Use a different type of birth control.  Eat a healthy diet and get regular exercise. This will help keep your immune system strong. When should you call for help? Watch closely for changes in your health, and be sure to contact your doctor if:     You do not get better as expected. Where can you learn more? Go to https://Lokata.rupepiceweb.Browsercast.com. org and sign in to your ClearSaleing account. Enter K906 in the Pososhok.ru box to learn more about \"Candidiasis: Care Instructions. \"     If you do not have an account, please click on the \"Sign Up Now\" link. Current as of: November 22, 2021               Content Version: 13.2  © 7380-8831 Healthwise, [facility]. Care instructions adapted under license by [facility] ([facility]). If you have questions about a medical condition or this instruction, always ask your healthcare professional. [place] [address] any warranty or liability for your use of this information.

## 2022-04-07 NOTE — PROGRESS NOTES
[facility], [address]  Phone [phone]   Fax [phone]      OFFICE VISIT: 2022    Americo Greenberg-: 1970      HPI  Reason For Visit:  Linda Harper is a 46 y.o.     1 Month Follow-Up, Anxiety, Urinary Tract Infection (was diagnosed on Mondday and still having abdominal pain and painful urination. Also have a yeast infection from abx. ), and Medication Refill (Klonopin)    Patient presents on follow-up he Alfonso. Me video conferencing. Anxiety:  She typically takes clonazepam 1 mg on a weekly basis for her anxiety. This medication is effective at managing her anxiety. She typically does take up to 3 a day to control her symptoms. Last prescription of clonazepam 1 mg was on 2022 for number 90 tablets  Adjunct medications:              Lexapro 20 mg daily              Rexulti 1 mg daily              Gabapentin 100 mg 3 times daily  Symptoms: Anxiety is controlled to functional state.     WILLA was reviewed today per office protocol. Report shows No discrepancies.  Fill pattern is consistent from single provider(s) at single pharmacy(s). Request # [id number]    She also has Ongoing symptoms of urinary tract infection. She is definitely getting better, but still has some residual symptoms. We are going to send in some Pyridium 200 mg 3 times daily x2 days. We will also send in Diflucan as she believes she may be having a yeast infection. vitals were not taken for this visit. There is no height or weight on file to calculate BMI. I have reviewed the following with the Ms. Greenberg   Lab Review  Office Visit on 2022   Component Date Value    Color, UA 2022 orange     Clarity, UA 2022 cloudy     Glucose, UA POC 2022 100 mg/dL     Bilirubin, UA 2022 Small     Ketones, UA 2022 Negative     Spec Grav, UA 2022 1.015     Blood, UA POC 2022 Negative     pH, UA 2022 5.0     Protein, UA POC 04/04/2022 30 mg/dL     Urobilinogen, UA 04/04/2022 2.0 E.U./dL     Leukocytes, UA 04/04/2022 Trace     Nitrite, UA 04/04/2022 Positive     Appearance, Fluid 04/04/2022 Cloudy*   Office Visit on 03/02/2022   Component Date Value    HPV TYPE 16 03/02/2022 Not Detected     HPV TYPE 18 03/02/2022 Not Detected     HPVOH (OTHER TYPES) 03/02/2022 Not Detected     HPV Comment 03/02/2022 See below    Office Visit on 01/21/2022   Component Date Value    Influenza A Ab 01/21/2022 neg     Influenza B Ab 01/21/2022 neg     Strep A Ag 01/21/2022 None Detected     SARS-CoV-2, PCR 01/21/2022 Not Detected      Copies of these are in the chart. Current Outpatient Medications   Medication Sig Dispense Refill    phenazopyridine (PYRIDIUM) 200 MG tablet Take 1 tablet by mouth 3 times daily as needed for Pain 6 tablet 1    clonazePAM (KLONOPIN) 1 MG tablet Take 1 tablet by mouth 3 times daily as needed for Anxiety for up to 30 days. 90 tablet 0    ciprofloxacin (CIPRO) 500 MG tablet Take 1 tablet by mouth 2 times daily for 10 days 20 tablet 0    valACYclovir (VALTREX) 1 g tablet Take 2 tablets every 12 hours at first onset of cold sore. 4 tablet 3    escitalopram (LEXAPRO) 20 MG tablet Take 1.5 tablets by mouth daily 135 tablet 3    fluconazole (DIFLUCAN) 150 MG tablet Take 1 tablet by mouth daily for 3 days (Patient not taking: Reported on 4/7/2022) 3 tablet 0     No current facility-administered medications for this visit.        Allergies: Sulfa antibiotics     Past Medical History:   Diagnosis Date    Insomnia     Malignant neoplasm of endocervix ([address]     reported by pt that she had cone bx and no further tx needed       Family History   Problem Relation Age of Onset    Ulcerative Colitis Mother     High Blood Pressure Father     High Blood Pressure Brother     Crohn's Disease Maternal Aunt     Ulcerative Colitis Paternal Aunt     Ulcerative Colitis Paternal Uncle     Colon Cancer Paternal Grandfather Past Surgical History:   Procedure Laterality Date    BREAST BIOPSY Left 2017    neg    COLONOSCOPY      HEMORRHOID SURGERY  2018    TUBAL LIGATION         Social History     Tobacco Use    Smoking status: Former Smoker     Packs/day: 0.10     Years: 25.00     Pack years: 2.50     Types: Cigarettes     Quit date: 3/31/2022     Years since quittin.0    Smokeless tobacco: Never Used    Tobacco comment: just a social smoker   Substance Use Topics    Alcohol use: Yes     Comment: socailly        Review of Systems   Constitutional: Negative. HENT: Negative. Eyes: Negative. Respiratory: Negative. Cardiovascular: Negative. Gastrointestinal: Positive for anal bleeding. Endocrine: Negative. Genitourinary: Positive for dysuria, frequency and hematuria. Musculoskeletal: Negative. Skin: Negative. Allergic/Immunologic: Negative. Neurological: Negative. Hematological: Negative. Psychiatric/Behavioral: The patient is nervous/anxious (stressful time in life, but better control). She has been smoking more recently. Physical Exam  Physical exam was not performed today as this was a video teleconference visit using Doxy. Me      ASSESSMENT      ICD-10-CM    1. Generalized anxiety disorder  F41.1 clonazePAM (KLONOPIN) 1 MG tablet   2. Acute cystitis with hematuria  N30.01    3. Yeast vaginitis  B37.3    4. Rectal bleeding  K62.5 Ryan Chaudhary MD, Gastroenterology, [facility]    1. Generalized anxiety disorder  Doing very well on present medication regimen. We will continue the same  - clonazePAM (KLONOPIN) 1 MG tablet; Take 1 tablet by mouth 3 times daily as needed for Anxiety for up to 30 days. Dispense: 90 tablet; Refill: 0    2. Acute cystitis with hematuria  Continue with present antibiotics. She does feel that she is getting better. She is presently taking Cipro. 3. Yeast vaginitis  Treat with Diflucan 150 mg daily x3 days    4.  Rectal bleeding  Refer to GI for colonoscopy. She does have rectal bleeding and she has a strong family history of inflammatory bowel disease. She also has a history of Suraj Chin MD, Gastroenterology, [facility] [address] This Encounter   Procedures   Joyce Owen MD, Gastroenterology, [facility]        Return in about 1 month (around 5/7/2022) for Alessandro Gil, was evaluated through a synchronous (real-time) audio-video encounter. The patient (or guardian if applicable) is aware that this is a billable service, which includes applicable co-pays. This Virtual Visit was conducted with patient's (and/or legal guardian's) consent. The visit was conducted pursuant to the emergency declaration under the [address] [place] [address], [address] [place] [address] waiver authority and the [employer] and [employer] General Act. Patient identification was verified, and a caregiver was present when appropriate. The patient was located at home in a state where the provider was licensed to provide care. Total time spent for this encounter: 30m    --LETA Woodruff DO on 4/7/2022 at 5:17 PM    An electronic signature was used to authenticate this note. WDL

## 2022-04-12 ENCOUNTER — NURSE ONLY (OUTPATIENT)
Dept: PRIMARY CARE CLINIC | Age: 52
End: 2022-04-12

## 2022-04-12 DIAGNOSIS — R30.0 DYSURIA: ICD-10-CM

## 2022-04-12 DIAGNOSIS — R30.0 DYSURIA: Primary | ICD-10-CM

## 2022-04-12 RX ORDER — CEFDINIR 300 MG/1
300 CAPSULE ORAL 2 TIMES DAILY
Qty: 20 CAPSULE | Refills: 0 | Status: SHIPPED | OUTPATIENT
Start: 2022-04-12 | End: 2022-04-22

## 2022-04-14 LAB — URINE CULTURE, ROUTINE: NORMAL

## 2022-04-17 ENCOUNTER — TELEPHONE (OUTPATIENT)
Dept: PRIMARY CARE CLINIC | Age: 52
End: 2022-04-17

## 2022-04-18 NOTE — TELEPHONE ENCOUNTER
----- Message from [facility],[address], DO sent at 4/15/2022  8:01 AM CDT -----  Urine culture is negative. It appears that there is not a urinary tract infection at all. If symptoms persist, we will need to look for an alternate cause.

## 2022-04-29 ENCOUNTER — OFFICE VISIT (OUTPATIENT)
Dept: GASTROENTEROLOGY | Age: 52
End: 2022-04-29
Payer: COMMERCIAL

## 2022-04-29 VITALS
OXYGEN SATURATION: 98 % | HEIGHT: 66 IN | BODY MASS INDEX: 22.02 KG/M2 | SYSTOLIC BLOOD PRESSURE: 126 MMHG | WEIGHT: 137 LBS | DIASTOLIC BLOOD PRESSURE: 72 MMHG | HEART RATE: 74 BPM

## 2022-04-29 DIAGNOSIS — Z86.010 PERSONAL HISTORY OF COLONIC POLYPS: ICD-10-CM

## 2022-04-29 DIAGNOSIS — R10.10 UPPER ABDOMINAL PAIN: ICD-10-CM

## 2022-04-29 DIAGNOSIS — R19.8 ALTERNATING CONSTIPATION AND DIARRHEA: ICD-10-CM

## 2022-04-29 DIAGNOSIS — R19.5 PENCILLING OF STOOLS: ICD-10-CM

## 2022-04-29 DIAGNOSIS — K62.5 BRBPR (BRIGHT RED BLOOD PER RECTUM): Primary | ICD-10-CM

## 2022-04-29 DIAGNOSIS — R11.0 NAUSEA: ICD-10-CM

## 2022-04-29 PROCEDURE — 99204 OFFICE O/P NEW MOD 45 MIN: CPT | Performed by: NURSE PRACTITIONER

## 2022-04-29 PROCEDURE — G8427 DOCREV CUR MEDS BY ELIG CLIN: HCPCS | Performed by: NURSE PRACTITIONER

## 2022-04-29 PROCEDURE — 4004F PT TOBACCO SCREEN RCVD TLK: CPT | Performed by: NURSE PRACTITIONER

## 2022-04-29 PROCEDURE — 3017F COLORECTAL CA SCREEN DOC REV: CPT | Performed by: NURSE PRACTITIONER

## 2022-04-29 PROCEDURE — G8420 CALC BMI NORM PARAMETERS: HCPCS | Performed by: NURSE PRACTITIONER

## 2022-04-29 ASSESSMENT — ENCOUNTER SYMPTOMS
BACK PAIN: 0
SHORTNESS OF BREATH: 0
VOMITING: 0
CONSTIPATION: 1
SORE THROAT: 1
BLOOD IN STOOL: 0
ABDOMINAL PAIN: 1
COUGH: 0
DIARRHEA: 1
ANAL BLEEDING: 1
VOICE CHANGE: 0
ABDOMINAL DISTENTION: 0
RECTAL PAIN: 0
TROUBLE SWALLOWING: 0
NAUSEA: 1

## 2022-04-29 NOTE — PATIENT INSTRUCTIONS
You are going to have an Endoscopy and here are some basic instructions:    Nothing to eat or drink after midnight EXCEPT:  PLEASE TAKE MEDICATION(S) FOR HIGH BLOOD PRESSURE, SEIZURES, HEART, AND THYROID WITH A SIP OF WATER AT LEAST 2 HOURS PRIOR TO ARRIVAL TIME.   YOU MAY ALSO TAKE ANY INHALERS YOU ARE PRESCRIBED. You will not be able to drive for 24 hours after the procedure due to sedation. Bring a  with you the day of the procedure. No aspirin, ibuprofen, naproxen, fish oil or vitamin E for 5 days before procedure. Continue current medications. If you are on blood thinners, clearance from the prescribing physician will be obtained before your procedure is scheduled. Increased [email or web address] may be associated with discontinuation of your blood thinner and include, but not limited to, stroke, TIA, or cardiac event. If biopsies are taken during the procedure they will be sent to a pathologist for analysis. You will be notified by mail of the pathology results in 2-3 weeks. Your physician may also schedule a follow up appointment with the nurse practitioner to discuss pathology, symptoms or to check if you have had any problems related to your procedure. If you prefer not to return to the office after your procedure please discuss this with your physician on the day of your procedure. You are going to have a colonoscopy and here are some instructions: You will be given specific directions regarding restrictions to diet and bowel prep instructions including laxatives. Please read these instructions one week prior to your scheduled procedure to ensure that you are prepared. Follow prep instructions provided for bowel prep. Take all of the bowel prep as directed. If you are having problems with nausea, stop your prep for 30-45 min to allow the nausea to subside before resuming your prep.      Nothing to eat or drink after midnight the day of the procedure EXCEPT:  PLEASE TAKE MEDICATION(S) FOR HIGH BLOOD PRESSURE, THYROID, SEIZURES, AND HEART THE MORNING OF THE PROCEDURE WITH A SIP OF WATER  AT LEAST 2 HOURS PRIOR TO ARRIVAL TIME.   YOU MAY ALSO TAKE ANY INHALERS YOU ARE PRESCRIBED. You will not be able to drive for 24 hours after the procedure due to sedation. Bring a  with you the day of the procedure. No aspirin, ibuprofen, naproxen, fish oil or vitamin E for 5 days before procedure. If you are on blood thinners, clearance from the prescribing physician will be obtained before your procedure is scheduled. Increased [email or web address] may be associated with discontinuation of your blood thinner and include, but not limited to, stroke, TIA, or cardiac event. If polyps are removed during the procedure they will be sent to a pathologist for analysis. You will be notified by mail of the pathology results in 2-3 weeks. Your physician may also schedule a follow up appointment with the nurse practitioner to discuss pathology, symptoms or to check if you have had any problems related to your procedure. If you prefer not to return to the office after your procedure please discuss this with your physician on the day of your colonoscopy. Final recommendations are based on the pathologist report. Your diet before a colonoscopy bowel preparation is very important to ensure a successful colon exam. It is recommended to consider certain changes to your diet three to four days prior to the procedure. Remember that your bowels need to be empty for the exam.    What foods are good to eat? Cut down on heavy solid foods three to four days before the procedure and start introducing lighter meals to your diet. The following food suggestions are a good part of your diet before a colonoscopy bowel preparation.   Light meat that is easily digestible such as chicken (without the skin)   Potatoes without skin   Cheese   Eggs   A light meal of steamed white fish   Light clear

## 2022-04-29 NOTE — PROGRESS NOTES
Subjective:      Donte Segura is a55 y.o. female  Chief Complaint   Patient presents with    New Patient       HPI  PCP: Mallory Nunez DO  Referring Provider: Siam Farr DO  New pt referral  From PCP     Pt reports rectal bleeding  Started 2 1/2 months ago  Occurs intermittently  BRB noted in toilet bowl water and also dripping into panties and on TP with wiping  Has rectal pain with this    Reports alternating pencilling of stools with constipation and diarrhea  Started 6 months ago    Reports nausea  Started 6-8 months ago  Waxes and wanes  Occurs during times of anxiety and stress  No vomiting    Reports upper abd pain  MOISE region. \"stabbing and cramping\" pain  Started 8 months ago  Occurs intermittently  No identifiable triggers  Occurs a few days a week, and lasts about 45 min and resolves on its own  Only occurs during nighttime hours. Reports she has had mutiple colonoscopy and had colon polyps removed twice  Last colonoscopy was normal, per pt      Family HX:  mother had UC, paternal grandfather had colon cancer  Pt denies family hx of colon polyps, gastric CA and esophageal CA.     Past Medical History:   Diagnosis Date    Insomnia     Malignant neoplasm of endocervix ([address]     reported by pt that she had cone bx and no further tx needed          Past Surgical History:   Procedure Laterality Date    BREAST BIOPSY Left 2017    neg    COLONOSCOPY  10/20/2020    DR MONTALVO- NORMAL    HEMORRHOID SURGERY  01/2018    TUBAL LIGATION         Social History     Socioeconomic History    Marital status:      Spouse name: None    Number of children: None    Years of education: None    Highest education level: None   Occupational History    None   Tobacco Use    Smoking status: Current Some Day Smoker     Years: 25.00     Types: Cigarettes    Smokeless tobacco: Never Used    Tobacco comment: just a social smoker   Vaping Use    Vaping Use: Former    Quit date: 3/1/2022 Substance and Sexual Activity    Alcohol use: Yes     Comment: socailly    Drug use: Never    Sexual activity: None   Other Topics Concern    None   Social History Narrative    None     Social Determinants of Health     Financial Resource Strain: Low Risk     Difficulty of Paying Living Expenses: Not hard at all   Food Insecurity: No Food Insecurity    Worried About Running Out of Food in the Last Year: Never true    Nitesh of Food in the Last Year: Never true   Transportation Needs:     Lack of Transportation (Medical): Not on file    Lack of Transportation (Non-Medical): Not on file   Physical Activity:     Days of Exercise per Week: Not on file    Minutes of Exercise per Session: Not on file   Stress:     Feeling of Stress : Not on file   Social Connections:     Frequency of Communication with Friends and Family: Not on file    Frequency of Social Gatherings with Friends and Family: Not on file    Attends Mandaen Services: Not on file    Active Member of [address] [place] [address] or Organizations: Not on file    Attends Club or Organization Meetings: Not on file    Marital Status: Not on file   Intimate Partner Violence:     Fear of Current or Ex-Partner: Not on file    Emotionally Abused: Not on file    Physically Abused: Not on file    Sexually Abused: Not on file   Housing Stability:     Unable to Pay for Housing in the Last Year: Not on file    Number of [place] in the Last Year: Not on file    Unstable Housing in the Last Year: Not on file       Allergies   Allergen Reactions    Sulfa Antibiotics Hives       Current Outpatient Medications   Medication Sig Dispense Refill    clonazePAM (KLONOPIN) 1 MG tablet Take 1 tablet by mouth 3 times daily as needed for Anxiety for up to 30 days. 90 tablet 0    valACYclovir (VALTREX) 1 g tablet Take 2 tablets every 12 hours at first onset of cold sore.  (Patient taking differently: as needed ) 4 tablet 3    escitalopram (LEXAPRO) 20 MG tablet Take 1.5 tablets by mouth daily 135 tablet 3     No current facility-administered medications for this visit. Review of Systems   Constitutional: Negative for appetite change, fatigue, fever and unexpected weight change. HENT: Positive for sore throat. Negative for trouble swallowing and voice change. Respiratory: Negative for cough and shortness of breath. Cardiovascular: Negative for chest pain, palpitations and leg swelling. Gastrointestinal: Positive for abdominal pain, anal bleeding, constipation, diarrhea and nausea. Negative for abdominal distention, blood in stool, rectal pain and vomiting. Genitourinary: Negative for hematuria. Musculoskeletal: Positive for neck pain. Negative for arthralgias and back pain. Neurological: Negative for dizziness, weakness, light-headedness and headaches. Psychiatric/Behavioral: Negative for dysphoric mood and sleep disturbance. The patient is nervous/anxious. All other systems reviewed and are negative. Objective:     Physical Exam  Vitals and nursing note reviewed. Constitutional:       Appearance: She is well-developed. Comments: /72   Pulse 74   Ht 5' 6\" (1.676 m)   Wt 137 lb (62.1 kg)   SpO2 98%   BMI 22.11 kg/m²    Eyes:      General: No scleral icterus. Conjunctiva/sclera: Conjunctivae normal.      Pupils: Pupils are equal, round, and reactive to light. Neck:      Thyroid: No thyromegaly. Cardiovascular:      Rate and Rhythm: Normal rate and regular rhythm. Heart sounds: Normal heart sounds. No murmur heard. No friction rub. No gallop. Pulmonary:      Effort: Pulmonary effort is normal. No respiratory distress. Breath sounds: Normal breath sounds. Abdominal:      General: Bowel sounds are normal. There is no distension. Palpations: Abdomen is soft. Tenderness: There is abdominal tenderness (josi with palpation). There is no rebound. Musculoskeletal:         General: No deformity.  Normal range of motion. Cervical back: Normal range of motion and neck supple. Neurological:      Mental Status: She is alert and oriented to person, place, and time. Cranial Nerves: No cranial nerve deficit. Psychiatric:         Judgment: Judgment normal.           Assessment:       Diagnosis Orders   1. BRBPR (bright red blood per rectum)  COLONOSCOPY W/ OR W/O BIOPSY   2. Pencilling of stools  COLONOSCOPY W/ OR W/O BIOPSY   3. Alternating constipation and diarrhea  COLONOSCOPY W/ OR W/O BIOPSY   4. Upper abdominal pain  ESOPHAGOSCOPY / EGD   5. Nausea  ESOPHAGOSCOPY / EGD   6. Personal history of colonic polyps  COLONOSCOPY W/ OR W/O BIOPSY         Plan:      1. Schedule outpatient endoscopy r/o h pylori and celiac. Patient advised no Aspirin, Fish Oil, Vit E or NSAIDs 5 (five) days before procedure. Follow-up Visit: per   Pt Education:   Risks, benefits, and alternatives to endoscopy were discussed. Patient voices understanding of risks of, but not limited to, perforation, bleeding, and infection. The risk of perforation is increased with esophageal dilatation. All questions answered to patient's satisfaction. Patient is agreable to proceed. 2. Schedule outpatient colonoscopy with random colon bx. Patient advised no Aspirin, Fish Oil, Vit E or NSAIDs 5 (five) days before procedure. Follow-up Visit: per Dr Isak Reese  Pt education:  Risks, benefits, and alternatives to colonoscopy were discussed. Risks of colonoscopy include, but are not limited to, perforation, bleeding, and infection. We discussed that the risk for perforation is 1-3 in 5,000  at the time of colonoscopy;   and 1-2% risk of bleeding post-polypectomy. All questions answered to the satisfaction of the patient. Pt is agreeable to proceed.

## 2022-05-06 ENCOUNTER — TELEMEDICINE (OUTPATIENT)
Dept: PRIMARY CARE CLINIC | Age: 52
End: 2022-05-06
Payer: COMMERCIAL

## 2022-05-06 DIAGNOSIS — F41.1 GENERALIZED ANXIETY DISORDER: ICD-10-CM

## 2022-05-06 PROCEDURE — 99442 PR PHYS/QHP TELEPHONE EVALUATION 11-20 MIN: CPT | Performed by: PEDIATRICS

## 2022-05-06 RX ORDER — CLONAZEPAM 1 MG/1
1 TABLET ORAL 3 TIMES DAILY PRN
Qty: 90 TABLET | Refills: 0 | Status: SHIPPED | OUTPATIENT
Start: 2022-05-06 | End: 2022-06-06 | Stop reason: SDUPTHER

## 2022-05-06 ASSESSMENT — ENCOUNTER SYMPTOMS
ANAL BLEEDING: 1
EYES NEGATIVE: 1
ALLERGIC/IMMUNOLOGIC NEGATIVE: 1
RESPIRATORY NEGATIVE: 1

## 2022-05-06 NOTE — PROGRESS NOTES
[address]19 [facility] 32, [address]  Phone [phone]   Fax [phone]      OFFICE VISIT: 2022    Lenora Greenberg-: 1970      \[facility]\""  Reason For Visit:  Benjamin Garcia is a 46 y.o. No chief complaint on file. Patient presents on follow-up he Alfonso. Me video conferencing. Anxiety:  She typically takes clonazepam 1 mg on a weekly basis for her anxiety. This medication is effective at managing her anxiety. She typically does take up to 3 a day to control her symptoms. Last prescription of clonazepam 1 mg was on 2022 for number 90 tablets  Adjunct medications:              Lexapro 20 mg daily              Rexulti 1 mg daily              Gabapentin 100 mg 3 times daily  Symptoms: Anxiety is controlled to functional state.     WILLA was reviewed today per office protocol. Report shows No discrepancies.  Fill pattern is consistent from single provider(s) at single pharmacy(s). Request #[id number]      She has seen GI and they now have her scheduled for EGD and Colonoscopy. These studies are pending.      vitals were not taken for this visit. There is no height or weight on file to calculate BMI. I have reviewed the following with the Ms. Greenberg   Lab Review  Nurse Only on 2022   Component Date Value    Urine Culture, Routine 2022 No growth at 36-48 hours    Office Visit on 2022   Component Date Value    Color, UA 2022 orange     Clarity, UA 2022 cloudy     Glucose, UA POC 2022 100 mg/dL     Bilirubin, UA 2022 Small     Ketones, UA 2022 Negative     Spec Grav, UA 2022 1.015     Blood, UA POC 2022 Negative     pH, UA 2022 5.0     Protein, UA POC 2022 30 mg/dL     Urobilinogen, UA 2022 2.0 E.U./dL     Leukocytes, UA 2022 Trace     Nitrite, UA 2022 Positive     Appearance, Fluid 2022 Cloudy*   Office Visit on 2022   Component Date Value    HPV TYPE 16 03/02/2022 Not Detected     HPV TYPE 18 03/02/2022 Not Detected     HPVOH (OTHER TYPES) 03/02/2022 Not Detected     HPV Comment 03/02/2022 See below    Office Visit on 01/21/2022   Component Date Value    Influenza A Ab 01/21/2022 neg     Influenza B Ab 01/21/2022 neg     Strep A Ag 01/21/2022 None Detected     SARS-CoV-2, PCR 01/21/2022 Not Detected      Copies of these are in the chart. Current Outpatient Medications   Medication Sig Dispense Refill    clonazePAM (KLONOPIN) 1 MG tablet Take 1 tablet by mouth 3 times daily as needed for Anxiety for up to 30 days. 90 tablet 0    valACYclovir (VALTREX) 1 g tablet Take 2 tablets every 12 hours at first onset of cold sore. (Patient taking differently: as needed ) 4 tablet 3    escitalopram (LEXAPRO) 20 MG tablet Take 1.5 tablets by mouth daily 135 tablet 3     No current facility-administered medications for this visit. Allergies: Sulfa antibiotics     Past Medical History:   Diagnosis Date    Insomnia     Malignant neoplasm of endocervix ([address]     reported by pt that she had cone bx and no further tx needed       Family History   Problem Relation Age of Onset    Ulcerative Colitis Mother     High Blood Pressure Father     High Blood Pressure Brother     Crohn's Disease Maternal Aunt     Ulcerative Colitis Paternal Aunt     Ulcerative Colitis Paternal Uncle     Colon Cancer Paternal Grandfather     Esophageal Cancer Neg Hx     Liver Cancer Neg Hx     Rectal Cancer Neg Hx     Stomach Cancer Neg Hx        Past Surgical History:   Procedure Laterality Date    BREAST BIOPSY Left 2017    neg    COLONOSCOPY  10/20/2020    DR MONTALVO- NORMAL    HEMORRHOID SURGERY  01/2018    TUBAL LIGATION         Social History     Tobacco Use    Smoking status: Current Some Day Smoker     Years: 25.00     Types: Cigarettes    Smokeless tobacco: Never Used    Tobacco comment: just a social smoker   Substance Use Topics    Alcohol use:  Yes Comment: socailly        Review of Systems   Constitutional: Negative. HENT: Negative. Eyes: Negative. Respiratory: Negative. Cardiovascular: Negative. Gastrointestinal: Positive for anal bleeding. Some epigastric pain   Endocrine: Negative. Genitourinary: Negative. Musculoskeletal: Negative. Skin: Negative. Allergic/Immunologic: Negative. Neurological: Negative. Hematological: Negative. Psychiatric/Behavioral: The patient is nervous/anxious (stressful time in life, but better control). She has been smoking more recently. Physical Exam  Physical exam was not performed today as this was a telephone conference visit      ASSESSMENT      ICD-10-CM    1. Generalized anxiety disorder  F41.1 clonazePAM (KLONOPIN) 1 MG tablet         PLAN    1. Generalized anxiety disorder  Doing very well on present medication regimen. We will continue the same  - clonazePAM (KLONOPIN) 1 MG tablet; Take 1 tablet by mouth 3 times daily as needed for Anxiety for up to 30 days. Dispense: 90 tablet; Refill: 0      No orders of the defined types were placed in this encounter. Return in about 1 month (around 6/6/2022) for 15. Due to patients co-morbidities and risk for potential exposure of COVID 19 pandemic. Patient was contacted and agreed to proceed with a telephone virtual visit. The risks and benefits of converting to a telephone virtual visit were discussed in light of the current infectious disease epidemic. Patient also understood that insurance coverage and co-pays are up to their individual insurance plans. Provider performed history of present illness and review of systems. Diagnosis and treatment plan was discussed with patient. Pharmacy of choice was reviewed along with past medical history, medication allergies, and current medications.  Education provided to patient or patient parents/guardian with current illness diagnosis as well as when to seek additional healthcare due to changing or for worsening symptoms. Patient voiced understanding. 20 minutes were spent on the phone with patient. Iman Brock, was evaluated through a synchronous (real-time) audio-video encounter. The patient (or guardian if applicable) is aware that this is a billable service, which includes applicable co-pays. This Virtual Visit was conducted with patient's (and/or legal guardian's) consent. The visit was conducted pursuant to the emergency declaration under the [address] [place] [address] authority and the [employer] General Act. Patient identification was verified, and a caregiver was present when appropriate. The patient was located at home in a state where the provider was licensed to provide care. Total time spent for this encounter: 20m    --LETA Eddy DO on 5/6/2022 at 5:20 PM    An electronic signature was used to authenticate this note.

## 2022-05-06 NOTE — PATIENT INSTRUCTIONS
Patient Education        Learning About Anxiety Disorders  What are anxiety disorders? Anxiety disorders are a type of medical problem. They cause severe anxiety. When you feel anxious, you feel that something bad is about to happen. Thisfeeling interferes with your life. These disorders include:   Generalized anxiety disorder. You feel worried and stressed about many everyday events and activities. This goes on for several months and disrupts your life on most days.  Panic disorder. You have repeated panic attacks. A panic attack is a sudden, intense fear or anxiety. It may make you feel short of breath. Your heart may pound.  Social anxiety disorder. You feel very anxious about what you will say or do in front of people. For example, you may be scared to talk or eat in public. This problem affects your daily life.  Phobias. You are very scared of a specific object, situation, or activity. For example, you may fear spiders, high places, or small spaces. What are the symptoms? Generalized anxiety disorder  Symptoms may include:   Feeling worried and stressed about many things almost every day.  Feeling tired or irritable. You may have a hard time concentrating.  Having headaches or muscle aches.  Having a hard time getting to sleep or staying asleep. Panic disorder  You may have repeated panic attacks when there is no reason for feeling afraid. You may change your daily activities because you worry that you will haveanother attack. Symptoms may include:   Intense fear, terror, or anxiety.  Trouble breathing or very fast breathing.  Chest pain or tightness.  A heartbeat that races or is not regular. Social anxiety disorder  Symptoms may include:   Fear about a social situation, such as eating in front of others or speaking in public. You may worry a lot. Or you may be afraid that something bad will happen.  Anxiety that can cause you to blush, sweat, and feel shaky.    A heartbeat that is faster than normal.   A hard time focusing. Phobias  Symptoms may include:   More fear than most people of being around an object, being in a situation, or doing an activity. You might also be stressed about the chance of being around the thing you fear.  Worry about losing control, panicking, fainting, or having physical symptoms like a faster heartbeat when you are around the situation or object. How are these disorders treated? Anxiety disorders can be treated with medicines or counseling. A combination ofboth may be used. Medicines may include:   Antidepressants. These may help your symptoms by keeping chemicals in your brain in balance.  Benzodiazepines. These may give you short-term relief of your symptoms. Some people use cognitive-behavioral therapy. A therapist helps you learn tochange stressful or bad thoughts into helpful thoughts. Lead a healthy lifestyle  A healthy lifestyle may help you feel better.  Get at least 30 minutes of exercise on most days of the week. Walking is a good choice.  Eat a healthy diet. Include fruits, vegetables, lean proteins, and whole grains in your diet each day.  Try to go to bed at the same time every night. Try for 8 hours of sleep a night.  Find ways to manage stress. Try relaxation exercises.  Avoid alcohol and illegal drugs. Follow-up care is a key part of your treatment and safety. Be sure to make and go to all appointments, and call your doctor if you are having problems. It's also a good idea to know your test results and keep alist of the medicines you take. Where can you learn more? Go to https://mojgan.Scoop.it. org and sign in to your [employer] account. Enter Z619 in the [facility] box to learn more about \"Learning About Anxiety Disorders. \"     If you do not have an account, please click on the \"Sign Up Now\" link.   Current as of: June 16, 2021               Content Version: 13.2  © 0753-5325 Healthwise, Incorporated. Care instructions adapted under license by [facility] ([facility]). If you have questions about a medical condition or this instruction, always ask your healthcare professional. [address] any warranty or liability for your use of this information.

## 2022-05-17 ENCOUNTER — ANESTHESIA EVENT (OUTPATIENT)
Dept: OPERATING ROOM | Age: 52
End: 2022-05-17

## 2022-05-17 ENCOUNTER — ANESTHESIA (OUTPATIENT)
Dept: OPERATING ROOM | Age: 52
End: 2022-05-17

## 2022-05-17 ENCOUNTER — HOSPITAL ENCOUNTER (OUTPATIENT)
Age: 52
Setting detail: OUTPATIENT SURGERY
Discharge: HOME | End: 2022-05-17
Attending: INTERNAL MEDICINE | Admitting: INTERNAL MEDICINE
Payer: COMMERCIAL

## 2022-05-17 ENCOUNTER — APPOINTMENT (OUTPATIENT)
Dept: OPERATING ROOM | Age: 52
End: 2022-05-17

## 2022-05-17 ENCOUNTER — HOSPITAL ENCOUNTER (OUTPATIENT)
Age: 52
Setting detail: SPECIMEN
Discharge: HOME | End: 2022-05-17
Payer: COMMERCIAL

## 2022-05-17 VITALS
HEIGHT: 66 IN | HEART RATE: 61 BPM | SYSTOLIC BLOOD PRESSURE: 123 MMHG | WEIGHT: 130 LBS | OXYGEN SATURATION: 95 % | DIASTOLIC BLOOD PRESSURE: 72 MMHG | TEMPERATURE: 97 F | RESPIRATION RATE: 18 BRPM | BODY MASS INDEX: 20.89 KG/M2

## 2022-05-17 PROCEDURE — 88342 IMHCHEM/IMCYTCHM 1ST ANTB: CPT

## 2022-05-17 PROCEDURE — G8909 PT DOC NO BURN PRIOR TO D/C: HCPCS

## 2022-05-17 PROCEDURE — 88305 TISSUE EXAM BY PATHOLOGIST: CPT

## 2022-05-17 PROCEDURE — G8913 PT DOC NO WRONG EVENT: HCPCS

## 2022-05-17 PROCEDURE — G8911 PT DOC NO FALL IN ASC: HCPCS

## 2022-05-17 PROCEDURE — 43239 EGD BIOPSY SINGLE/MULTIPLE: CPT | Performed by: INTERNAL MEDICINE

## 2022-05-17 PROCEDURE — 43239 EGD BIOPSY SINGLE/MULTIPLE: CPT

## 2022-05-17 PROCEDURE — G8915 PT NOT TRANS TO HOSP AT D/C: HCPCS

## 2022-05-17 PROCEDURE — 45380 COLONOSCOPY AND BIOPSY: CPT | Performed by: INTERNAL MEDICINE

## 2022-05-17 PROCEDURE — 45380 COLONOSCOPY AND BIOPSY: CPT

## 2022-05-17 RX ORDER — MIDAZOLAM HYDROCHLORIDE 1 MG/ML
INJECTION INTRAMUSCULAR; INTRAVENOUS PRN
Status: DISCONTINUED | OUTPATIENT
Start: 2022-05-17 | End: 2022-05-17 | Stop reason: SDUPTHER

## 2022-05-17 RX ORDER — SODIUM CHLORIDE 9 MG/ML
INJECTION, SOLUTION INTRAVENOUS CONTINUOUS
Status: DISCONTINUED | OUTPATIENT
Start: 2022-05-17 | End: 2022-05-17 | Stop reason: HOSPADM

## 2022-05-17 RX ORDER — PROPOFOL 10 MG/ML
INJECTION, EMULSION INTRAVENOUS PRN
Status: DISCONTINUED | OUTPATIENT
Start: 2022-05-17 | End: 2022-05-17 | Stop reason: SDUPTHER

## 2022-05-17 RX ORDER — LIDOCAINE HYDROCHLORIDE 10 MG/ML
INJECTION, SOLUTION INFILTRATION; PERINEURAL PRN
Status: DISCONTINUED | OUTPATIENT
Start: 2022-05-17 | End: 2022-05-17 | Stop reason: SDUPTHER

## 2022-05-17 RX ADMIN — MIDAZOLAM HYDROCHLORIDE 2 MG: 1 INJECTION INTRAMUSCULAR; INTRAVENOUS at 11:58

## 2022-05-17 RX ADMIN — SODIUM CHLORIDE: 9 INJECTION, SOLUTION INTRAVENOUS at 11:10

## 2022-05-17 RX ADMIN — LIDOCAINE HYDROCHLORIDE 50 MG: 10 INJECTION, SOLUTION INFILTRATION; PERINEURAL at 12:02

## 2022-05-17 RX ADMIN — PROPOFOL 320 MG: 10 INJECTION, EMULSION INTRAVENOUS at 12:02

## 2022-05-17 ASSESSMENT — PAIN SCALES - GENERAL: PAINLEVEL_OUTOF10: 0

## 2022-05-17 ASSESSMENT — PAIN - FUNCTIONAL ASSESSMENT: PAIN_FUNCTIONAL_ASSESSMENT: NONE - DENIES PAIN

## 2022-05-17 ASSESSMENT — LIFESTYLE VARIABLES: SMOKING_STATUS: 1

## 2022-05-17 NOTE — ANESTHESIA PRE PROCEDURE
Department of Anesthesiology  Preprocedure Note       Name:  Javier Vaughn   Age:  46 y.o.  :  1970                                          MRN:  [id number]         Date:  2022      Surgeon: Jennifer Joya):  Teresa Slaughter MD    Procedure: Procedure(s):  EGD BIOPSY  COLONOSCOPY DIAGNOSTIC    Medications prior to admission:   Prior to Admission medications    Medication Sig Start Date End Date Taking? Authorizing Provider   clonazePAM (KLONOPIN) 1 MG tablet Take 1 tablet by mouth 3 times daily as needed for Anxiety for up to 30 days. 22  B Cambria Phoenix, DO   valACYclovir (VALTREX) 1 g tablet Take 2 tablets every 12 hours at first onset of cold sore. Patient taking differently: as needed  2/15/22   B Cambria Phoenix, DO   escitalopram (LEXAPRO) 20 MG tablet Take 1.5 tablets by mouth daily 21   ZAKI Pace       Current medications:    Current Facility-Administered Medications   Medication Dose Route Frequency Provider Last Rate Last Admin    0.9 % sodium chloride infusion   IntraVENous Continuous Teresa Slaughter  mL/hr at 22 1110 New Bag at 22 1110       Allergies:     Allergies   Allergen Reactions    Sulfa Antibiotics Hives       Problem List:    Patient Active Problem List   Diagnosis Code    Insomnia G47.00    BRBPR (bright red blood per rectum) K62.5    Pencilling of stools R19.5    Alternating constipation and diarrhea R19.8    Upper abdominal pain R10.10    Nausea R11.0    Personal history of colonic polyps Z86.010       Past Medical History:        Diagnosis Date    Insomnia     Malignant neoplasm of endocervix ([address]     reported by pt that she had cone bx and no further tx needed       Past Surgical History:        Procedure Laterality Date    BREAST BIOPSY Left 2017    neg    COLONOSCOPY  10/20/2020    DR MONTALVO- NORMAL    HEMORRHOID SURGERY  2018    TUBAL LIGATION         Social History:    Social History Tobacco Use    Smoking status: Current Some Day Smoker     Years: 25.00     Types: Cigarettes    Smokeless tobacco: Never Used    Tobacco comment: just a social smoker   Substance Use Topics    Alcohol use: Yes     Comment: socailly                                Ready to quit: Not Answered  Counseling given: Not Answered  Comment: just a social smoker      Vital Signs (Current):   Vitals:    05/17/22 1053   BP: 109/68   Pulse: 70   Resp: 18   Temp: 97.4 °F (36.3 °C)   TempSrc: Tympanic   SpO2: 96%   Weight: 130 lb (59 kg)   Height: 5' 6\" (1.676 m)                                              BP Readings from Last 3 Encounters:   05/17/22 109/68   04/29/22 126/72   04/04/22 110/70       NPO Status: Time of last liquid consumption: 0500                        Time of last solid consumption: 1600                        Date of last liquid consumption: 05/17/22                        Date of last solid food consumption: 05/13/22    BMI:   Wt Readings from Last 3 Encounters:   05/17/22 130 lb (59 kg)   04/29/22 137 lb (62.1 kg)   04/04/22 142 lb 6.4 oz (64.6 kg)     Body mass index is 20.98 kg/m². CBC:   Lab Results   Component Value Date    WBC 8.0 05/12/2021    RBC 4.22 05/12/2021    HGB 13.8 05/12/2021    HCT 43.0 05/12/2021    .9 05/12/2021    RDW 12.1 05/12/2021     05/12/2021       CMP:   Lab Results   Component Value Date     05/12/2021    K 4.6 05/12/2021     05/12/2021    CO2 24 05/12/2021    BUN 9 05/12/2021    CREATININE 0.7 05/12/2021    GFRAA >59 05/12/2021    LABGLOM >60 05/12/2021    GLUCOSE 79 05/12/2021    PROT 7.0 05/12/2021    CALCIUM 9.3 05/12/2021    BILITOT 0.6 05/12/2021    ALKPHOS 58 05/12/2021    AST 16 05/12/2021    ALT 9 05/12/2021       POC Tests: No results for input(s): POCGLU, POCNA, POCK, POCCL, POCBUN, POCHEMO, POCHCT in the last 72 hours.     Coags: No results found for: PROTIME, INR, APTT    HCG (If Applicable): No results found for: PREGTESTUR, PREGSERUM, HCG, HCGQUANT     ABGs: No results found for: PHART, PO2ART, NZK6XZD, ZRL5PMF, BEART, X6PZAMKU     Type & Screen (If Applicable):  No results found for: LABABO, LABRH    Drug/Infectious Status (If Applicable):  No results found for: HIV, HEPCAB    COVID-19 Screening (If Applicable):   Lab Results   Component Value Date    COVID19 Not Detected 01/21/2022           Anesthesia Evaluation  Patient summary reviewed and Nursing notes reviewed no history of anesthetic complications:   Airway: Mallampati: I  TM distance: >3 FB     Mouth opening: > = 3 FB Dental: normal exam         Pulmonary:   (+) current smoker          Patient smoked on day of surgery. Cardiovascular:Negative CV ROS  Exercise tolerance: good (>4 METS),           Rhythm: regular  Rate: normal           Beta Blocker:  Not on Beta Blocker         Neuro/Psych:                ROS comment: insomnia GI/Hepatic/Renal: Neg GI/Hepatic/Renal ROS            Endo/Other: Negative Endo/Other ROS                    Abdominal:             Vascular: Other Findings:             Anesthesia Plan      general and TIVA     ASA 2       Induction: intravenous. Anesthetic plan and risks discussed with patient. Plan discussed with CRNA.                   Polo Baumgarten, APRN - ROGER   5/17/2022

## 2022-05-17 NOTE — OP NOTE
Endoscopic Procedure Note    Patient: Magalie Evans : 1970  Med Rec#: [id number] Acc#: [id number]     Primary Care Provider LETA Brody DO    Endoscopist: Chapin Merino MD, MD    Date of Procedure:  2022    Procedure:   1. EGD with cold biopsies    Indications: For both EGD and colonoscopy exams today:  1. BRBPR (bright red blood per rectum)     2. Pencilling of stools     3. Alternating constipation and diarrhea     4. Upper abdominal pain     5. Nausea     6. Personal history of colonic polyps      7. Family HX:  mother had UC, paternal grandfather had colon cancer    Anesthesia:  Sedation was administered by anesthesia who monitored the patient during the procedure. Estimated Blood Loss: minimal    Procedure:   After reviewing the patient's chart and obtaining informed consent, the patient was placed in the left lateral decubitus position. A forward-viewing Olympus endoscope was lubricated and inserted through the mouth into the oropharynx. Under direct visualization, the upper esophagus was intubated. The scope was advanced to the level of the third portion of duodenum. Scope was slowly withdrawn with careful inspection of the mucosal surfaces. The scope was retroflexed for inspection of the gastric fundus and incisura. Findings and maneuvers are listed in impression below. The patient tolerated the procedure well. The scope was removed. There were no immediate complications. Findings/IMPRESSION:  Esophagus: normal and a normal EG junction at 40 cm  There is no obvious hiatal hernia present. Stomach:  abnormal: Patchy mucosal changes with a few 2-3 mm in diameter erosions in the antrum and patchy erythema suggestive of mild gastritis noted -  Gastric biopsies were taken from the antrum and body to rule out Helicobacter pylori infection. NO ulcers or masses or gastric outlet obstruction or retained food or fluid.  Rugae were normal and lumen distended well

## 2022-05-17 NOTE — H&P
sore.  Patient taking differently: as needed  2/15/22   RADAMES Grady,    escitalopram (LEXAPRO) 20 MG tablet Take 1.5 tablets by mouth daily 8/5/21   ZAKI Harmon       Past Medical History:  Past Medical History:   Diagnosis Date    Insomnia     Malignant neoplasm of endocervix ([address]     reported by pt that she had cone bx and no further tx needed       Past Surgical History:  Past Surgical History:   Procedure Laterality Date    BREAST BIOPSY Left 2017    neg    COLONOSCOPY  10/20/2020    DR MONTALVO- NORMAL    HEMORRHOID SURGERY  01/2018    TUBAL LIGATION         Social History:  Social History     Tobacco Use    Smoking status: Current Some Day Smoker     Years: 25.00     Types: Cigarettes    Smokeless tobacco: Never Used    Tobacco comment: just a social smoker   Vaping Use    Vaping Use: Former    Quit date: 3/1/2022   Substance Use Topics    Alcohol use: Yes     Comment: socailly    Drug use: Never       Vital Signs: There were no vitals filed for this visit. Physical Exam:  Cardiac:  [x]WNL  []Comments:  Pulmonary:  [x]WNL   []Comments:  Neuro/Mental Status:  [x]WNL  []Comments:  Abdominal:  [x]WNL    []Comments:  Other:   []WNL  []Comments:    Informed Consent:  The risks and benefits of the procedure have been discussed with either the patient or if they cannot consent, their representative. Assessment:  Patient examined and appropriate for planned sedation and procedure. Plan:  Proceed with planned sedation and procedure as above.          Pooja Campos MD

## 2022-05-17 NOTE — OP NOTE
Patient: Anurag Michael : 1970  Med Rec#: [id number] Acc#: [id number]   Primary Care Provider B. Toya Bosworth, DO    Date of Procedure:  2022    Endoscopist: Cinthia Neely MD, MD    Referring Provider: Nura Love DO,     Operation Performed: Colonoscopy up to the terminal ileum with random cold colon biopsies    Indications: For both EGD and colonoscopy exams today:  1. BRBPR (bright red blood per rectum)     2. Pencilling of stools     3. Alternating constipation and diarrhea     4. Upper abdominal pain     5. Nausea     6. Personal history of colonic polyps      7. Family HX:  mother had UC, paternal grandfather had colon cancer      Anesthesia:  Sedation was administered by anesthesia who monitored the patient during the procedure. I met with Anurag Michael prior to procedure. We discussed the procedure itself, and I have discussed the risks of endoscopy (including-- but not limited to-- pain, discomfort, bleeding potentially requiring second endoscopic procedure and/or blood transfusion, organ perforation requiring operative repair, damage to organs near the colon, infection, aspiration, cardiopulmonary/allergic reaction), benefits, indications to endoscopy. Additionally, we discussed options other than colonoscopy. The patient expressed understanding. All questions answered. The patient decided to proceed with the procedure. Signed informed consent was placed on the chart. Blood Loss: minimal    Withdrawal time: More than 6 minutes  Bowel Prep: adequate     Complications: no immediate complications    DESCRIPTION OF PROCEDURE:     A time out was performed. After written informed consent was obtained, the patient was placed in the left lateral position. The perianal area was inspected, and a digital rectal exam was performed. A rectal exam was performed: normal tone, no palpable lesions.  At this point, a forward viewing Olympus colonoscope was inserted into the anus and carefully advanced to the terminal ileum. The cecum was identified by the ileocecal valve and the appendiceal orifice. The colonoscope was then slowly withdrawn with careful inspection of the mucosa in a linear and circumferential fashion. The scope was retroflexed in the rectum. Suction was utilized during the procedure to remove as much air as possible from the bowel. The colonoscope was removed from the patient, and the procedure was terminated. Findings are listed below. Findings: The mucosa appeared normal throughout the entire examined colon and the examined terminal ileum. Random cold biopsies were taken from the colon to check for microscopic colitis. NO large polyps or masses or strictures or colitis or overt IBD. Internal hemorrhoids-Grade 1 without any bleeding stigmata today but likely source of her recent bright red blood per rectum. Retroflexion in the rectum was otherwise normal and revealed no further abnormalities     Otherwise, no clear-cut lesions to explain the patient's symptoms which are likely related to IBS  Recommendations:  1. Repeat colonoscopy: pending pathology -biopsies today are negative for microscopic colitis, in 5 years for colorectal cancer surveillance due to her history of polyps before the age of 48 and her family history    2. Await biopsy results-you will receive a letter with your results within 7-10 days    -- Resume previous meds and diet  - GI clinic f/u 4-6 weeks   - Keep scheduled f/u appts with other MDs     - NO ASA/NSAIDs x 2 weeks    Findings and recommendations were discussed w/ the patient. A copy of the images was provided.     Robbie Suarez MD, MD  5/17/2022  10:32 AM

## 2022-05-17 NOTE — ANESTHESIA POSTPROCEDURE EVALUATION
Department of Anesthesiology  Postprocedure Note    Patient: Magda Pederson  MRN: [id number]  YOB: 1970  Date of evaluation: 5/17/2022  Time:  12:24 PM     Procedure Summary     Date: 05/17/22 Room / Location: [facility] ENDO 02 / [address]    Anesthesia Start: 2668 Anesthesia Stop: 9099    Procedures:       EGD BIOPSY (N/A Esophagus)      COLONOSCOPY DIAGNOSTIC (N/A Abdomen) Diagnosis: (UPPER ABD PAIN, NAUSEA, PENCILLING OF STOOLS, CONSTIP/DIARRHEA)    Surgeons: Vinod Sinha MD Responsible Provider: ZAKI Manrique CRNA    Anesthesia Type: general, TIVA ASA Status: 2          Anesthesia Type: No value filed. Allegra Phase I:      Allegra Phase II:      Last vitals: Reviewed and per EMR flowsheets.        Anesthesia Post Evaluation    Patient location during evaluation: bedside  Patient participation: waiting for patient participation  Level of consciousness: sleepy but conscious  Pain score: 0  Airway patency: patent  Nausea & Vomiting: no nausea and no vomiting  Complications: no  Cardiovascular status: hemodynamically stable and blood pressure returned to baseline  Respiratory status: acceptable and room air  Hydration status: stable

## 2022-05-19 ENCOUNTER — TELEPHONE (OUTPATIENT)
Dept: PRIMARY CARE CLINIC | Age: 52
End: 2022-05-19

## 2022-05-19 NOTE — TELEPHONE ENCOUNTER
----- Message from Dedrick Brown DO sent at 5/18/2022  4:48 PM CDT -----  EGD and colon biopsies look normal

## 2022-06-06 ENCOUNTER — TELEMEDICINE (OUTPATIENT)
Dept: PRIMARY CARE CLINIC | Age: 52
End: 2022-06-06
Payer: COMMERCIAL

## 2022-06-06 DIAGNOSIS — F41.1 GENERALIZED ANXIETY DISORDER: ICD-10-CM

## 2022-06-06 PROCEDURE — 99213 OFFICE O/P EST LOW 20 MIN: CPT | Performed by: PEDIATRICS

## 2022-06-06 PROCEDURE — 3017F COLORECTAL CA SCREEN DOC REV: CPT | Performed by: PEDIATRICS

## 2022-06-06 PROCEDURE — G8427 DOCREV CUR MEDS BY ELIG CLIN: HCPCS | Performed by: PEDIATRICS

## 2022-06-06 RX ORDER — HYDROXYZINE HYDROCHLORIDE 25 MG/1
25 TABLET, FILM COATED ORAL EVERY 8 HOURS PRN
Qty: 30 TABLET | Refills: 0 | Status: SHIPPED | OUTPATIENT
Start: 2022-06-06 | End: 2022-07-05 | Stop reason: SDUPTHER

## 2022-06-06 RX ORDER — CLONAZEPAM 1 MG/1
1 TABLET ORAL 3 TIMES DAILY PRN
Qty: 90 TABLET | Refills: 0 | Status: SHIPPED | OUTPATIENT
Start: 2022-06-06 | End: 2022-07-05 | Stop reason: SDUPTHER

## 2022-06-06 ASSESSMENT — ENCOUNTER SYMPTOMS
ANAL BLEEDING: 0
ALLERGIC/IMMUNOLOGIC NEGATIVE: 1
EYES NEGATIVE: 1
RESPIRATORY NEGATIVE: 1

## 2022-06-07 NOTE — PROGRESS NOTES
[address] [address] [place], [address]  Phone [phone]   Fax [phone]      OFFICE VISIT: 2022    Milena Greenberg-: 1970      HPI  Reason For Visit:  Milena is a 46 y.o.     1 Month Follow-Up (clonazepam working well no concerns) and Medication Refill (clonazepam)    Patient presents on follow-up he Alfonso. Me video conferencing.     Anxiety:  She typically takes clonazepam 1 mg on a weekly basis for her anxiety. This medication is effective at managing her anxiety. She typically does take up to 3 a day to control her symptoms. Last prescription of clonazepam 1 mg was on 2022 for number 90 tablets  Adjunct medications:              Lexapro 20 mg daily              Rexulti 1 mg daily              Gabapentin 100 mg 3 times daily  Symptoms: Anxiety is controlled to functional state.     WILLA was reviewed today per office protocol. Report shows No discrepancies.  Fill pattern is consistent from single provider(s) at single pharmacy(s). Request # [id number]      vitals were not taken for this visit. There is no height or weight on file to calculate BMI. I have reviewed the following with the Ms. Greenberg   Lab Review  Nurse Only on 2022   Component Date Value    Urine Culture, Routine 2022 No growth at 36-48 hours    Office Visit on 2022   Component Date Value    Color, UA 2022 orange     Clarity, UA 2022 cloudy     Glucose, UA POC 2022 100 mg/dL     Bilirubin, UA 2022 Small     Ketones, UA 2022 Negative     Spec Grav, UA 2022 1.015     Blood, UA POC 2022 Negative     pH, UA 2022 5.0     Protein, UA POC 2022 30 mg/dL     Urobilinogen, UA 2022 2.0 E.U./dL     Leukocytes, UA 2022 Trace     Nitrite, UA 2022 Positive     Appearance, Fluid 2022 Cloudy*   Office Visit on 2022   Component Date Value    HPV TYPE 16 2022 Not Detected     HPV TYPE 18 03/02/2022 Not Detected     HPVOH (OTHER TYPES) 03/02/2022 Not Detected     HPV Comment 03/02/2022 See below    Office Visit on 01/21/2022   Component Date Value    Influenza A Ab 01/21/2022 neg     Influenza B Ab 01/21/2022 neg     Strep A Ag 01/21/2022 None Detected     SARS-CoV-2, PCR 01/21/2022 Not Detected      Copies of these are in the chart. Current Outpatient Medications   Medication Sig Dispense Refill    clonazePAM (KLONOPIN) 1 MG tablet Take 1 tablet by mouth 3 times daily as needed for Anxiety for up to 30 days. 90 tablet 0    hydrOXYzine HCl (ATARAX) 25 MG tablet Take 1 tablet by mouth every 8 hours as needed for Itching 30 tablet 0    valACYclovir (VALTREX) 1 g tablet Take 2 tablets every 12 hours at first onset of cold sore. (Patient taking differently: as needed ) 4 tablet 3    escitalopram (LEXAPRO) 20 MG tablet Take 1.5 tablets by mouth daily 135 tablet 3     No current facility-administered medications for this visit.        Allergies: Sulfa antibiotics     Past Medical History:   Diagnosis Date    Insomnia     Malignant neoplasm of endocervix ([address]     reported by pt that she had cone bx and no further tx needed       Family History   Problem Relation Age of Onset    Ulcerative Colitis Mother     High Blood Pressure Father     High Blood Pressure Brother     Crohn's Disease Maternal Aunt     Ulcerative Colitis Paternal Aunt     Ulcerative Colitis Paternal Uncle     Colon Cancer Paternal Grandfather     Esophageal Cancer Neg Hx     Liver Cancer Neg Hx     Rectal Cancer Neg Hx     Stomach Cancer Neg Hx        Past Surgical History:   Procedure Laterality Date    BREAST BIOPSY Left 2017    neg    COLONOSCOPY  10/20/2020    DR MONTALVO- NORMAL    COLONOSCOPY N/A 05/17/2022    Dr Sweetie Resendiz, (-)Micro Colitis, Int hem Gr 1 wo bleeding, no clear cut lesions to explains symptoms, 5 year recall    HEMORRHOID SURGERY  01/2018    TUBAL LIGATION      UPPER GASTROINTESTINAL ENDOSCOPY N/A 05/17/2022    Dr Gilford Comment, sugg of mild gastritis, (-)hpylori, (-) Sprue       Social History     Tobacco Use    Smoking status: Current Some Day Smoker     Years: 25.00     Types: Cigarettes    Smokeless tobacco: Never Used    Tobacco comment: just a social smoker   Substance Use Topics    Alcohol use: Yes     Comment: socailly        Review of Systems   Constitutional: Negative. HENT: Negative. Eyes: Negative. Respiratory: Negative. Cardiovascular: Negative. Gastrointestinal: Negative for anal bleeding. Some epigastric pain   Endocrine: Negative. Genitourinary: Negative. Musculoskeletal: Negative. Skin: Negative. Allergic/Immunologic: Negative. Neurological: Negative. Hematological: Negative. Psychiatric/Behavioral: The patient is nervous/anxious (stressful time in life, but better control). She has been smoking more recently. Physical Exam  Physical exam was not performed today as this was a video teleconference visit using Doxy. Me      ASSESSMENT      ICD-10-CM    1. Generalized anxiety disorder  F41.1 clonazePAM (KLONOPIN) 1 MG tablet     hydrOXYzine HCl (ATARAX) 25 MG tablet         PLAN    1. Generalized anxiety disorder  Will continue with present regimen. We can try atarax for further breakthrough anxiety or even insomnia. - clonazePAM (KLONOPIN) 1 MG tablet; Take 1 tablet by mouth 3 times daily as needed for Anxiety for up to 30 days. Dispense: 90 tablet; Refill: 0  - hydrOXYzine HCl (ATARAX) 25 MG tablet; Take 1 tablet by mouth every 8 hours as needed for Itching  Dispense: 30 tablet; Refill: 0      No orders of the defined types were placed in this encounter. Return in about 1 month (around 7/6/2022) for 15. This was an in-house visit.

## 2022-06-07 NOTE — PATIENT INSTRUCTIONS
Patient Education        Learning About Anxiety Disorders  What are anxiety disorders? Anxiety disorders are a type of medical problem. They cause severe anxiety. When you feel anxious, you feel that something bad is about to happen. Thisfeeling interferes with your life. These disorders include:   Generalized anxiety disorder. You feel worried and stressed about many everyday events and activities. This goes on for several months and disrupts your life on most days.  Panic disorder. You have repeated panic attacks. A panic attack is a sudden, intense fear or anxiety. It may make you feel short of breath. Your heart may pound.  Social anxiety disorder. You feel very anxious about what you will say or do in front of people. For example, you may be scared to talk or eat in public. This problem affects your daily life.  Phobias. You are very scared of a specific object, situation, or activity. For example, you may fear spiders, high places, or small spaces. What are the symptoms? Generalized anxiety disorder  Symptoms may include:   Feeling worried and stressed about many things almost every day.  Feeling tired or irritable. You may have a hard time concentrating.  Having headaches or muscle aches.  Having a hard time getting to sleep or staying asleep. Panic disorder  You may have repeated panic attacks when there is no reason for feeling afraid. You may change your daily activities because you worry that you will haveanother attack. Symptoms may include:   Intense fear, terror, or anxiety.  Trouble breathing or very fast breathing.  Chest pain or tightness.  A heartbeat that races or is not regular. Social anxiety disorder  Symptoms may include:   Fear about a social situation, such as eating in front of others or speaking in public. You may worry a lot. Or you may be afraid that something bad will happen.  Anxiety that can cause you to blush, sweat, and feel shaky.    A heartbeat that is faster than normal.   A hard time focusing. Phobias  Symptoms may include:   More fear than most people of being around an object, being in a situation, or doing an activity. You might also be stressed about the chance of being around the thing you fear.  Worry about losing control, panicking, fainting, or having physical symptoms like a faster heartbeat when you are around the situation or object. How are these disorders treated? Anxiety disorders can be treated with medicines or counseling. A combination ofboth may be used. Medicines may include:   Antidepressants. These may help your symptoms by keeping chemicals in your brain in balance.  Benzodiazepines. These may give you short-term relief of your symptoms. Some people use cognitive-behavioral therapy. A therapist helps you learn tochange stressful or bad thoughts into helpful thoughts. Lead a healthy lifestyle  A healthy lifestyle may help you feel better.  Get at least 30 minutes of exercise on most days of the week. Walking is a good choice.  Eat a healthy diet. Include fruits, vegetables, lean proteins, and whole grains in your diet each day.  Try to go to bed at the same time every night. Try for 8 hours of sleep a night.  Find ways to manage stress. Try relaxation exercises.  Avoid alcohol and illegal drugs. Follow-up care is a key part of your treatment and safety. Be sure to make and go to all appointments, and call your doctor if you are having problems. It's also a good idea to know your test results and keep alist of the medicines you take. Where can you learn more? Go to https://mojgan.[facility]. org and sign in to your [employer] account. Enter U487 in the [facility] box to learn more about \"Learning About Anxiety Disorders. \"     If you do not have an account, please click on the \"Sign Up Now\" link.   Current as of: June 16, 2021               Content Version: 13.2  © 1863-7745 Healthwise, Incorporated. Care instructions adapted under license by [facility] ([facility]). If you have questions about a medical condition or this instruction, always ask your healthcare professional. [address] any warranty or liability for your use of this information.

## 2022-07-05 DIAGNOSIS — F41.1 GENERALIZED ANXIETY DISORDER: ICD-10-CM

## 2022-07-05 RX ORDER — ESCITALOPRAM OXALATE 20 MG/1
30 TABLET ORAL DAILY
Qty: 135 TABLET | Refills: 3 | Status: SHIPPED | OUTPATIENT
Start: 2022-07-05 | End: 2022-08-02 | Stop reason: SDUPTHER

## 2022-07-05 RX ORDER — CLONAZEPAM 1 MG/1
1 TABLET ORAL 3 TIMES DAILY PRN
Qty: 90 TABLET | Refills: 0 | Status: SHIPPED | OUTPATIENT
Start: 2022-07-05 | End: 2022-08-02 | Stop reason: SDUPTHER

## 2022-07-05 RX ORDER — HYDROXYZINE HYDROCHLORIDE 25 MG/1
25 TABLET, FILM COATED ORAL EVERY 8 HOURS PRN
Qty: 30 TABLET | Refills: 0 | Status: SHIPPED | OUTPATIENT
Start: 2022-07-05 | End: 2022-07-26

## 2022-07-05 NOTE — TELEPHONE ENCOUNTER
Pt called back to reschedule apt due to provider being out of office. Will send refill request to provider for authorization. Raul Finnegan scanned to pts chart for review. Requested Prescriptions     Pending Prescriptions Disp Refills    clonazePAM (KLONOPIN) 1 MG tablet 90 tablet 0     Sig: Take 1 tablet by mouth 3 times daily as needed for Anxiety for up to 30 days.     escitalopram (LEXAPRO) 20 MG tablet 135 tablet 3     Sig: Take 1.5 tablets by mouth daily    hydrOXYzine HCl (ATARAX) 25 MG tablet 30 tablet 0     Sig: Take 1 tablet by mouth every 8 hours as needed for Itching

## 2022-07-24 DIAGNOSIS — F41.1 GENERALIZED ANXIETY DISORDER: ICD-10-CM

## 2022-07-25 RX ORDER — CLONAZEPAM 1 MG/1
TABLET ORAL
Qty: 90 TABLET | Refills: 0 | OUTPATIENT
Start: 2022-07-25

## 2022-07-25 NOTE — TELEPHONE ENCOUNTER
Requested Prescriptions     Pending Prescriptions Disp Refills    hydrOXYzine HCl (ATARAX) 25 MG tablet [Pharmacy Med Name: hydrOXYzine HCl 25 MG Oral Tablet] 30 tablet 0     Sig: TAKE 1 TABLET BY MOUTH EVERY 8 HOURS AS NEEDED FOR ITCHING

## 2022-07-26 RX ORDER — HYDROXYZINE HYDROCHLORIDE 25 MG/1
25 TABLET, FILM COATED ORAL EVERY 8 HOURS PRN
Qty: 30 TABLET | Refills: 0 | Status: SHIPPED | OUTPATIENT
Start: 2022-07-26 | End: 2022-08-02 | Stop reason: SDUPTHER

## 2022-07-26 NOTE — TELEPHONE ENCOUNTER
Sent rx to pharmacy for pt.      Requested Prescriptions     Signed Prescriptions Disp Refills    hydrOXYzine HCl (ATARAX) 25 MG tablet 30 tablet 0     Sig: Take 1 tablet by mouth every 8 hours as needed for Itching     Authorizing Provider: Merced Flanagan     Ordering User: Haylee Hayes

## 2022-08-02 ENCOUNTER — TELEMEDICINE (OUTPATIENT)
Dept: PRIMARY CARE CLINIC | Age: 52
End: 2022-08-02
Payer: COMMERCIAL

## 2022-08-02 DIAGNOSIS — F41.1 GENERALIZED ANXIETY DISORDER: Primary | ICD-10-CM

## 2022-08-02 PROCEDURE — 3017F COLORECTAL CA SCREEN DOC REV: CPT | Performed by: PEDIATRICS

## 2022-08-02 PROCEDURE — G8428 CUR MEDS NOT DOCUMENT: HCPCS | Performed by: PEDIATRICS

## 2022-08-02 PROCEDURE — 99213 OFFICE O/P EST LOW 20 MIN: CPT | Performed by: PEDIATRICS

## 2022-08-02 RX ORDER — HYDROXYZINE HYDROCHLORIDE 25 MG/1
25 TABLET, FILM COATED ORAL EVERY 8 HOURS PRN
Qty: 90 TABLET | Refills: 3 | Status: SHIPPED | OUTPATIENT
Start: 2022-08-02 | End: 2022-11-03 | Stop reason: SDUPTHER

## 2022-08-02 RX ORDER — CLONAZEPAM 1 MG/1
1 TABLET ORAL 3 TIMES DAILY PRN
Qty: 90 TABLET | Refills: 0 | Status: SHIPPED | OUTPATIENT
Start: 2022-08-02 | End: 2022-09-02 | Stop reason: SDUPTHER

## 2022-08-02 RX ORDER — ESCITALOPRAM OXALATE 20 MG/1
30 TABLET ORAL DAILY
Qty: 135 TABLET | Refills: 3 | Status: SHIPPED | OUTPATIENT
Start: 2022-08-02

## 2022-08-02 ASSESSMENT — ENCOUNTER SYMPTOMS
ANAL BLEEDING: 0
ALLERGIC/IMMUNOLOGIC NEGATIVE: 1
EYES NEGATIVE: 1
RESPIRATORY NEGATIVE: 1

## 2022-08-02 NOTE — PROGRESS NOTES
[address] [facility], [address]  Phone [phone]   Fax [phone]      OFFICE VISIT: 2022    Milena Greenberg-: 1970      HPI  Reason For Visit:  Milena is a 46 y.o. No chief complaint on file. Patient presents on follow-up he Alfonso. Me video conferencing. Anxiety:  She typically takes clonazepam 1 mg on a weekly basis for her anxiety. This medication is effective at managing her anxiety. She typically does take up to 3 a day to control her symptoms. Last prescription of clonazepam 1 mg was on 2022 for number 90 tablets  Adjunct medications:              Lexapro 30 mg daily   Atarax 25mg nightly prn. Symptoms: Anxiety is controlled to functional state. WILLA was reviewed today per office protocol. Report shows No discrepancies. Fill pattern is consistent from single provider(s) at single pharmacy(s). Request # [id number]      vitals were not taken for this visit. There is no height or weight on file to calculate BMI. I have reviewed the following with the Ms. Greenberg   Lab Review  Nurse Only on 2022   Component Date Value    Urine Culture, Routine 2022 No growth at 36-48 hours    Office Visit on 2022   Component Date Value    Color, UA 2022 orange     Clarity, UA 2022 cloudy     Glucose, UA POC 2022 100 mg/dL     Bilirubin, UA 2022 Small     Ketones, UA 2022 Negative     Spec Grav, UA 2022 1.015     Blood, UA POC 2022 Negative     pH, UA 2022 5.0     Protein, UA POC 2022 30 mg/dL     Urobilinogen, UA 2022 2.0 E.U./dL     Leukocytes, UA 2022 Trace     Nitrite, UA 2022 Positive     Appearance, Fluid 2022 Cloudy (A)   Office Visit on 2022   Component Date Value    HPV TYPE 16 2022 Not Detected     HPV TYPE 18 2022 Not Detected     HPVOH (OTHER TYPES) 2022 Not Detected     HPV Comment 2022 See below      Copies of these are in the chart. Current Outpatient Medications   Medication Sig Dispense Refill    hydrOXYzine HCl (ATARAX) 25 MG tablet Take 1 tablet by mouth every 8 hours as needed for Anxiety 90 tablet 3    escitalopram (LEXAPRO) 20 MG tablet Take 1.5 tablets by mouth in the morning. 135 tablet 3    clonazePAM (KLONOPIN) 1 MG tablet Take 1 tablet by mouth 3 times daily as needed for Anxiety for up to 30 days. 90 tablet 0    valACYclovir (VALTREX) 1 g tablet Take 2 tablets every 12 hours at first onset of cold sore. (Patient taking differently: as needed ) 4 tablet 3     No current facility-administered medications for this visit.        Allergies: Sulfa antibiotics     Past Medical History:   Diagnosis Date    Insomnia     Malignant neoplasm of endocervix ([address]     reported by pt that she had cone bx and no further tx needed       Family History   Problem Relation Age of Onset    Ulcerative Colitis Mother     High Blood Pressure Father     High Blood Pressure Brother     Crohn's Disease Maternal Aunt     Ulcerative Colitis Paternal Aunt     Ulcerative Colitis Paternal Uncle     Colon Cancer Paternal Grandfather     Esophageal Cancer Neg Hx     Liver Cancer Neg Hx     Rectal Cancer Neg Hx     Stomach Cancer Neg Hx        Past Surgical History:   Procedure Laterality Date    BREAST BIOPSY Left 2017    neg    COLONOSCOPY  10/20/2020    DR MONTALVO- NORMAL    COLONOSCOPY N/A 05/17/2022    Dr Shannon Massey, (-)Micro Colitis, Int hem Gr 1 wo bleeding, no clear cut lesions to explains symptoms, 5 year recall    HEMORRHOID SURGERY  01/2018    TUBAL LIGATION      UPPER GASTROINTESTINAL ENDOSCOPY N/A 05/17/2022    Dr Shannon Massey, sugg of mild gastritis, (-)hpylori, (-) Sprue       Social History     Tobacco Use    Smoking status: Some Days     Years: 25.00     Types: Cigarettes    Smokeless tobacco: Never    Tobacco comments:     just a social smoker   Substance Use Topics    Alcohol use: Yes     Comment: socailly Review of Systems   Constitutional: Negative. HENT: Negative. Eyes: Negative. Respiratory: Negative. Cardiovascular: Negative. Gastrointestinal:  Negative for anal bleeding. Some epigastric pain   Endocrine: Negative. Genitourinary: Negative. Musculoskeletal: Negative. Skin: Negative. Allergic/Immunologic: Negative. Neurological: Negative. Hematological: Negative. Psychiatric/Behavioral:  The patient is nervous/anxious (stressful time in life, but better control). She has been smoking more recently. Physical Exam  Physical exam was not performed today as this was a video teleconference visit using Doxy. me      ASSESSMENT      ICD-10-CM    1. Generalized anxiety disorder  F41.1 hydrOXYzine HCl (ATARAX) 25 MG tablet     escitalopram (LEXAPRO) 20 MG tablet     clonazePAM (KLONOPIN) 1 MG tablet            PLAN    1. Generalized anxiety disorder  The current medical regimen is effective;  continue present plan and medications. - hydrOXYzine HCl (ATARAX) 25 MG tablet; Take 1 tablet by mouth every 8 hours as needed for Anxiety  Dispense: 90 tablet; Refill: 3  - escitalopram (LEXAPRO) 20 MG tablet; Take 1.5 tablets by mouth in the morning. Dispense: 135 tablet; Refill: 3  - clonazePAM (KLONOPIN) 1 MG tablet; Take 1 tablet by mouth 3 times daily as needed for Anxiety for up to 30 days. Dispense: 90 tablet; Refill: 0    No orders of the defined types were placed in this encounter. Return in about 1 month (around 9/2/2022) for [address] [place] [address], was evaluated through a synchronous (real-time) audio-video encounter. The patient (or guardian if applicable) is aware that this is a billable service, which includes applicable co-pays. This Virtual Visit was conducted with patient's (and/or legal guardian's) consent.  The visit was conducted pursuant to the emergency declaration under the [facility], [facility] waiver authority

## 2022-08-08 ENCOUNTER — TELEPHONE (OUTPATIENT)
Dept: PRIMARY CARE CLINIC | Age: 52
End: 2022-08-08

## 2022-08-08 DIAGNOSIS — M54.50 LOW BACK PAIN, UNSPECIFIED BACK PAIN LATERALITY, UNSPECIFIED CHRONICITY, UNSPECIFIED WHETHER SCIATICA PRESENT: Primary | ICD-10-CM

## 2022-08-08 DIAGNOSIS — M54.2 NECK PAIN: ICD-10-CM

## 2022-08-16 ENCOUNTER — TELEPHONE (OUTPATIENT)
Dept: PRIMARY CARE CLINIC | Age: 52
End: 2022-08-16

## 2022-09-02 ENCOUNTER — TELEMEDICINE (OUTPATIENT)
Dept: PRIMARY CARE CLINIC | Age: 52
End: 2022-09-02
Payer: COMMERCIAL

## 2022-09-02 DIAGNOSIS — F41.1 GENERALIZED ANXIETY DISORDER: ICD-10-CM

## 2022-09-02 PROCEDURE — 99213 OFFICE O/P EST LOW 20 MIN: CPT | Performed by: PEDIATRICS

## 2022-09-02 PROCEDURE — G8428 CUR MEDS NOT DOCUMENT: HCPCS | Performed by: PEDIATRICS

## 2022-09-02 PROCEDURE — 3017F COLORECTAL CA SCREEN DOC REV: CPT | Performed by: PEDIATRICS

## 2022-09-02 RX ORDER — CLONAZEPAM 1 MG/1
1 TABLET ORAL 3 TIMES DAILY PRN
Qty: 90 TABLET | Refills: 0 | Status: SHIPPED | OUTPATIENT
Start: 2022-09-02 | End: 2022-10-03 | Stop reason: SDUPTHER

## 2022-09-02 ASSESSMENT — ENCOUNTER SYMPTOMS
ALLERGIC/IMMUNOLOGIC NEGATIVE: 1
EYES NEGATIVE: 1
ANAL BLEEDING: 0
RESPIRATORY NEGATIVE: 1

## 2022-09-02 NOTE — PROGRESS NOTES
[address] [facility], [address]  Phone [phone]   Fax [phone]      OFFICE VISIT: 2022    Milena Greenberg-: 1970      HPI  Reason For Visit:  Milena is a 46 y.o. No chief complaint on file. Patient presents on follow-up he Alfonso. Me video conferencing. Anxiety:  She typically takes clonazepam 1 mg on a weekly basis for her anxiety. This medication is effective at managing her anxiety. She typically does take up to 3 a day to control her symptoms. Last prescription of clonazepam 1 mg was on 2022 for number 90 tablets  Adjunct medications:              Lexapro 30 mg daily              Atarax 25mg nightly prn. Symptoms: Anxiety is controlled to functional state. WILLA was reviewed today per office protocol. Report shows No discrepancies. Fill pattern is consistent from single provider(s) at single pharmacy(s). Request # [id number]      vitals were not taken for this visit. There is no height or weight on file to calculate BMI. I have reviewed the following with the Ms. Greenberg   Lab Review  Nurse Only on 2022   Component Date Value    Urine Culture, Routine 2022 No growth at 36-48 hours    Office Visit on 2022   Component Date Value    Color, UA 2022 orange     Clarity, UA 2022 cloudy     Glucose, UA POC 2022 100 mg/dL     Bilirubin, UA 2022 Small     Ketones, UA 2022 Negative     Spec Grav, UA 2022 1.015     Blood, UA POC 2022 Negative     pH, UA 2022 5.0     Protein, UA POC 2022 30 mg/dL     Urobilinogen, UA 2022 2.0 E.U./dL     Leukocytes, UA 2022 Trace     Nitrite, UA 2022 Positive     Appearance, Fluid 2022 Cloudy (A)     Copies of these are in the chart. Current Outpatient Medications   Medication Sig Dispense Refill    clonazePAM (KLONOPIN) 1 MG tablet Take 1 tablet by mouth 3 times daily as needed for Anxiety for up to 30 days. 90 tablet 0    hydrOXYzine HCl (ATARAX) 25 MG tablet Take 1 tablet by mouth every 8 hours as needed for Anxiety 90 tablet 3    escitalopram (LEXAPRO) 20 MG tablet Take 1.5 tablets by mouth in the morning. 135 tablet 3    valACYclovir (VALTREX) 1 g tablet Take 2 tablets every 12 hours at first onset of cold sore. (Patient taking differently: as needed ) 4 tablet 3     No current facility-administered medications for this visit. Allergies: Sulfa antibiotics     Past Medical History:   Diagnosis Date    Insomnia     Malignant neoplasm of endocervix ([address]     reported by pt that she had cone bx and no further tx needed       Family History   Problem Relation Age of Onset    Ulcerative Colitis Mother     High Blood Pressure Father     High Blood Pressure Brother     Crohn's Disease Maternal Aunt     Ulcerative Colitis Paternal Aunt     Ulcerative Colitis Paternal Uncle     Colon Cancer Paternal Grandfather     Esophageal Cancer Neg Hx     Liver Cancer Neg Hx     Rectal Cancer Neg Hx     Stomach Cancer Neg Hx        Past Surgical History:   Procedure Laterality Date    BREAST BIOPSY Left 2017    neg    COLONOSCOPY  10/20/2020    DR MONTALVO- NORMAL    COLONOSCOPY N/A 05/17/2022    Dr Shahida Mariscal, (-)Micro Colitis, Int hem Gr 1 wo bleeding, no clear cut lesions to explains symptoms, 5 year recall    HEMORRHOID SURGERY  01/2018    TUBAL LIGATION      UPPER GASTROINTESTINAL ENDOSCOPY N/A 05/17/2022    Dr Shahida Mariscal, sugg of mild gastritis, (-)hpylori, (-) Sprue       Social History     Tobacco Use    Smoking status: Some Days     Years: 25.00     Types: Cigarettes    Smokeless tobacco: Never    Tobacco comments:     just a social smoker   Substance Use Topics    Alcohol use: Yes     Comment: socailly        Review of Systems   Constitutional: Negative. HENT: Negative. Eyes: Negative. Respiratory: Negative. Cardiovascular: Negative. Gastrointestinal:  Negative for anal bleeding.         Some epigastric pain   Endocrine: Negative. Genitourinary: Negative. Musculoskeletal: Negative. Skin: Negative. Allergic/Immunologic: Negative. Neurological: Negative. Hematological: Negative. Psychiatric/Behavioral:  The patient is nervous/anxious (stressful time in life, but better control). She has been smoking more recently. Physical Exam  Physical exam was not performed today as this was a video teleconference visit using Doxy. Me      ASSESSMENT      ICD-10-CM    1. Generalized anxiety disorder  F41.1 clonazePAM (KLONOPIN) 1 MG tablet            PLAN    1. Generalized anxiety disorder  The current medical regimen is effective;  continue present plan and medications. - clonazePAM (KLONOPIN) 1 MG tablet; Take 1 tablet by mouth 3 times daily as needed for Anxiety for up to 30 days. Dispense: 90 tablet; Refill: 0    No orders of the defined types were placed in this encounter. Return in about 1 month (around 10/2/2022). [facility], was evaluated through a synchronous (real-time) audio-video encounter. The patient (or guardian if applicable) is aware that this is a billable service, which includes applicable co-pays. This Virtual Visit was conducted with patient's (and/or legal guardian's) consent. The visit was conducted pursuant to the emergency declaration under the [address] [place] [address], [address] [place] [address] authority and the [employer] Act. Patient identification was verified, and a caregiver was present when appropriate. The patient was located at Home: [address] [place] [address]. [address]. Total time spent for this encounter:  20m    --LETA Cary DO on 9/2/2022 at 8:00 AM    An electronic signature was used to authenticate this note.

## 2022-09-06 ENCOUNTER — PATIENT MESSAGE (OUTPATIENT)
Dept: PRIMARY CARE CLINIC | Age: 52
End: 2022-09-06

## 2022-09-08 RX ORDER — NICOTINE 21 MG/24HR
1 PATCH, TRANSDERMAL 24 HOURS TRANSDERMAL DAILY
Qty: 14 PATCH | Refills: 5 | Status: CANCELLED | OUTPATIENT
Start: 2022-09-08 | End: 2022-09-22

## 2022-09-08 NOTE — TELEPHONE ENCOUNTER
We can send in a prescription for nicotine patches 14 mg, replace daily  Dispense #30 with 3 refills      Sent this in for pt in his chart.

## 2022-09-08 NOTE — TELEPHONE ENCOUNTER
We can send in a prescription for nicotine patches 14 mg, replace daily  Dispense #30 with 3 refills

## 2022-09-27 RX ORDER — VALACYCLOVIR HYDROCHLORIDE 1 G/1
TABLET, FILM COATED ORAL
Qty: 4 TABLET | Refills: 0 | Status: SHIPPED | OUTPATIENT
Start: 2022-09-27 | End: 2022-10-10 | Stop reason: SDUPTHER

## 2022-09-27 NOTE — TELEPHONE ENCOUNTER
Received fax from pharmacy requesting refill on pts medication(s). Pt was last seen in office on 9/2/2022  and has a follow up scheduled for 10/3/2022. Will send request to  Dr. Geneva Menjivar  for authorization.      Requested Prescriptions     Pending Prescriptions Disp Refills    valACYclovir (VALTREX) 1 g tablet [Pharmacy Med Name: valACYclovir HCl 1 GM Oral Tablet] 4 tablet 0     Sig: TAKE 2 TABLETS BY MOUTH EVERY 12 HOURS AT  THE  FIRST  ONSET  OF  COLD  SORE

## 2022-10-03 ENCOUNTER — TELEMEDICINE (OUTPATIENT)
Dept: PRIMARY CARE CLINIC | Age: 52
End: 2022-10-03
Payer: COMMERCIAL

## 2022-10-03 DIAGNOSIS — M62.838 MUSCLE SPASM: Primary | ICD-10-CM

## 2022-10-03 DIAGNOSIS — F41.1 GENERALIZED ANXIETY DISORDER: ICD-10-CM

## 2022-10-03 PROCEDURE — 3017F COLORECTAL CA SCREEN DOC REV: CPT | Performed by: PEDIATRICS

## 2022-10-03 PROCEDURE — G8428 CUR MEDS NOT DOCUMENT: HCPCS | Performed by: PEDIATRICS

## 2022-10-03 PROCEDURE — 99213 OFFICE O/P EST LOW 20 MIN: CPT | Performed by: PEDIATRICS

## 2022-10-03 RX ORDER — CLONAZEPAM 1 MG/1
1 TABLET ORAL 3 TIMES DAILY PRN
Qty: 90 TABLET | Refills: 0 | Status: SHIPPED | OUTPATIENT
Start: 2022-10-03 | End: 2022-11-03 | Stop reason: SDUPTHER

## 2022-10-03 RX ORDER — CYCLOBENZAPRINE HCL 5 MG
5 TABLET ORAL 2 TIMES DAILY PRN
Qty: 60 TABLET | Refills: 0 | Status: SHIPPED | OUTPATIENT
Start: 2022-10-03 | End: 2022-11-03 | Stop reason: SDUPTHER

## 2022-10-03 ASSESSMENT — ENCOUNTER SYMPTOMS
ALLERGIC/IMMUNOLOGIC NEGATIVE: 1
ANAL BLEEDING: 0
RESPIRATORY NEGATIVE: 1
EYES NEGATIVE: 1

## 2022-10-03 NOTE — PROGRESS NOTES
[facility], [address]  Phone [phone]   Fax [phone]      OFFICE VISIT: 10/3/2022    Jeffery Penningtonkriss-: 1970      HPI  Reason For Visit:  Jeffery Schmidt is a 46 y.o. Anxiety    Patient presents on follow-up he Abrahamy. Me video conferencing. Anxiety:  She typically takes clonazepam 1 mg on a weekly basis for her anxiety. This medication is effective at managing her anxiety. She typically does take up to 3 a day to control her symptoms. Last prescription of clonazepam 1 mg was on 2022 for number 90 tablets  Adjunct medications:              Lexapro 30 mg daily              Atarax 25mg nightly prn. Symptoms: Anxiety is controlled to functional state. WILLA was reviewed today per office protocol. Report shows No discrepancies. Fill pattern is consistent from single provider(s) at single pharmacy(s). Request # [id number]        vitals were not taken for this visit. There is no height or weight on file to calculate BMI. I have reviewed the following with the Ms. Greenberg   Lab Review  Nurse Only on 2022   Component Date Value    Urine Culture, Routine 2022 No growth at 36-48 hours    Office Visit on 2022   Component Date Value    Color, UA 2022 orange     Clarity, UA 2022 cloudy     Glucose, UA POC 2022 100 mg/dL     Bilirubin, UA 2022 Small     Ketones, UA 2022 Negative     Spec Grav, UA 2022 1.015     Blood, UA POC 2022 Negative     pH, UA 2022 5.0     Protein, UA POC 2022 30 mg/dL     Urobilinogen, UA 2022 2.0 E.U./dL     Leukocytes, UA 2022 Trace     Nitrite, UA 2022 Positive     Appearance, Fluid 2022 Cloudy (A)      Copies of these are in the chart. Current Outpatient Medications   Medication Sig Dispense Refill    clonazePAM (KLONOPIN) 1 MG tablet Take 1 tablet by mouth 3 times daily as needed for Anxiety for up to 30 days.  90 tablet 0 cyclobenzaprine (FLEXERIL) 5 MG tablet Take 1 tablet by mouth 2 times daily as needed for Muscle spasms 60 tablet 0    valACYclovir (VALTREX) 1 g tablet TAKE 2 TABLETS BY MOUTH EVERY 12 HOURS AT  THE  FIRST  ONSET  OF  COLD  SORE 4 tablet 0    hydrOXYzine HCl (ATARAX) 25 MG tablet Take 1 tablet by mouth every 8 hours as needed for Anxiety 90 tablet 3    escitalopram (LEXAPRO) 20 MG tablet Take 1.5 tablets by mouth in the morning. 135 tablet 3     No current facility-administered medications for this visit. Allergies: Sulfa antibiotics     Past Medical History:   Diagnosis Date    Insomnia     Malignant neoplasm of endocervix ([address]     reported by pt that she had cone bx and no further tx needed       Family History   Problem Relation Age of Onset    Ulcerative Colitis Mother     High Blood Pressure Father     High Blood Pressure Brother     Crohn's Disease Maternal Aunt     Ulcerative Colitis Paternal Aunt     Ulcerative Colitis Paternal Uncle     Colon Cancer Paternal Grandfather     Esophageal Cancer Neg Hx     Liver Cancer Neg Hx     Rectal Cancer Neg Hx     Stomach Cancer Neg Hx        Past Surgical History:   Procedure Laterality Date    BREAST BIOPSY Left 2017    neg    COLONOSCOPY  10/20/2020    DR MONTALVO- NORMAL    COLONOSCOPY N/A 05/17/2022    Dr Roxanne Nicole, (-)Micro Colitis, Int hem Gr 1 wo bleeding, no clear cut lesions to explains symptoms, 5 year recall    HEMORRHOID SURGERY  01/2018    TUBAL LIGATION      UPPER GASTROINTESTINAL ENDOSCOPY N/A 05/17/2022    Dr Roxanne Nicole, sugg of mild gastritis, (-)hpylori, (-) Sprue       Social History     Tobacco Use    Smoking status: Some Days     Years: 25.00     Types: Cigarettes    Smokeless tobacco: Never    Tobacco comments:     just a social smoker   Substance Use Topics    Alcohol use: Yes     Comment: socailly        Review of Systems   Constitutional: Negative. HENT: Negative. Eyes: Negative. Respiratory: Negative. Cardiovascular: Negative. Gastrointestinal:  Negative for anal bleeding. Some epigastric pain   Endocrine: Negative. Genitourinary: Negative. Musculoskeletal: Negative. Skin: Negative. Allergic/Immunologic: Negative. Neurological: Negative. Hematological: Negative. Psychiatric/Behavioral:  The patient is nervous/anxious (stressful time in life, but better control). She has been smoking more recently. Physical Exam  Physical exam was not performed today as this was a video teleconference visit using [employer]. ME      ASSESSMENT      ICD-10-CM    1. Muscle spasm  M62.838 cyclobenzaprine (FLEXERIL) 5 MG tablet      2. Generalized anxiety disorder  F41.1 clonazePAM (KLONOPIN) 1 MG tablet            PLAN    1. Generalized anxiety disorder  The current medical regimen is effective;  continue present plan and medications. - clonazePAM (KLONOPIN) 1 MG tablet; Take 1 tablet by mouth 3 times daily as needed for Anxiety for up to 30 days. Dispense: 90 tablet; Refill: 0    2. Muscle spasm  We are going to try cyclobenzaprine 5 mg on a as needed basis to see if this helps with her neck pain and muscle spasms. - cyclobenzaprine (FLEXERIL) 5 MG tablet; Take 1 tablet by mouth 2 times daily as needed for Muscle spasms  Dispense: 60 tablet; Refill: 0    No orders of the defined types were placed in this encounter. Return in about 1 month (around 11/3/2022) for . [facility], was evaluated through a synchronous (real-time) audio-video encounter. The patient (or guardian if applicable) is aware that this is a billable service, which includes applicable co-pays. This Virtual Visit was conducted with patient's (and/or legal guardian's) consent. The visit was conducted pursuant to the emergency declaration under the [facility], [address] [place] [address] authority and the [employer] and [employer] General Act.   Patient identification was verified, and a caregiver was present when appropriate. The patient was located at Home: [address] [place] [address]. [address]. Total time spent for this encounter:  20m    --LETA Barker DO on 10/3/2022 at 5:29 PM    An electronic signature was used to authenticate this note.

## 2022-10-11 RX ORDER — VALACYCLOVIR HYDROCHLORIDE 1 G/1
TABLET, FILM COATED ORAL
Qty: 4 TABLET | Refills: 0 | Status: SHIPPED | OUTPATIENT
Start: 2022-10-11

## 2022-10-11 RX ORDER — VALACYCLOVIR HYDROCHLORIDE 1 G/1
TABLET, FILM COATED ORAL
Qty: 4 TABLET | Refills: 0 | OUTPATIENT
Start: 2022-10-11

## 2022-10-11 NOTE — TELEPHONE ENCOUNTER
Received fax from pharmacy requesting refill on pts medication(s). Pt was last seen in office on 10/3/2022  and has a follow up scheduled for 11/3/2022. Will send request to  Dr. Rosita Dumont  for authorization.      Requested Prescriptions     Pending Prescriptions Disp Refills    valACYclovir (VALTREX) 1 g tablet 4 tablet 0     Sig: TAKE 2 TABLETS BY MOUTH EVERY 12 HOURS AT  THE  FIRST  ONSET  OF  COLD  SORE

## 2024-05-15 ENCOUNTER — OFFICE VISIT (OUTPATIENT)
Dept: NEUROSURGERY | Facility: CLINIC | Age: 54
End: 2024-05-15
Payer: COMMERCIAL

## 2024-05-15 ENCOUNTER — APPOINTMENT (OUTPATIENT)
Dept: OTHER | Facility: HOSPITAL | Age: 54
End: 2024-05-15
Payer: COMMERCIAL

## 2024-05-15 ENCOUNTER — HOSPITAL ENCOUNTER (OUTPATIENT)
Dept: GENERAL RADIOLOGY | Facility: HOSPITAL | Age: 54
Discharge: HOME | End: 2024-05-15
Payer: COMMERCIAL

## 2024-05-15 VITALS — BODY MASS INDEX: 21.76 KG/M2 | WEIGHT: 135.4 LBS | HEIGHT: 66 IN

## 2024-05-15 DIAGNOSIS — M47.816 LUMBAR SPONDYLOSIS: ICD-10-CM

## 2024-05-15 DIAGNOSIS — F17.200 SMOKER: ICD-10-CM

## 2024-05-15 DIAGNOSIS — M54.16 LUMBAR RADICULOPATHY: Primary | ICD-10-CM

## 2024-05-15 PROCEDURE — 72110 X-RAY EXAM L-2 SPINE 4/>VWS: CPT

## 2024-05-15 PROCEDURE — 99204 OFFICE O/P NEW MOD 45 MIN: CPT | Performed by: PHYSICIAN ASSISTANT

## 2024-05-15 RX ORDER — CLONAZEPAM 1 MG/1
TABLET ORAL
COMMUNITY
Start: 2024-03-04

## 2024-05-15 RX ORDER — ROSUVASTATIN CALCIUM 20 MG/1
20 TABLET, COATED ORAL DAILY
COMMUNITY
Start: 2024-03-21

## 2024-05-15 RX ORDER — PREGABALIN 50 MG/1
50 CAPSULE ORAL 3 TIMES DAILY PRN
Qty: 90 CAPSULE | Refills: 0 | Status: SHIPPED | OUTPATIENT
Start: 2024-05-15

## 2024-05-15 RX ORDER — IBUPROFEN 800 MG/1
1 TABLET ORAL EVERY 6 HOURS PRN
COMMUNITY
Start: 2024-03-28

## 2024-05-15 RX ORDER — ESCITALOPRAM OXALATE 20 MG/1
20 TABLET ORAL DAILY
COMMUNITY

## 2024-05-15 RX ORDER — ERGOCALCIFEROL 1.25 MG/1
1 CAPSULE ORAL
COMMUNITY
Start: 2024-05-13

## 2024-05-15 NOTE — PROGRESS NOTES
Chief complaint:   Chief Complaint   Patient presents with    Back Pain     Left side sciatica pain pt stated she has been receiving SI joint injection left side with Dr. Weber.        Subjective     HPI: Miladys presents as referral request from Dr. Patrick Parmar for evaluation of chronic left-sided low back pain with radiating pain into the left buttock, posterior thigh terminating at the lateral calf.  Symptoms started about 2 years ago and have persisted.  She has 50% back pain and 50% leg pain.  Pain increases with activity but she also has significant but she also has significant leg pain at night.  She has had 3 left SI joint injections.  The first 1 provided symptom improvement for 1 month, the second for 3 weeks and the last one for 2 weeks.  She does note that she did not notice pain improvement until about 2 days after the injections.  She has utilized muscle relaxers which make her too sleepy as well as Oxycodone infrequently.  She has trialed gabapentin but did not tolerate it.  She has not had any therapy in the last 9 months.  She denies any focal weakness as well as numbness and tingling.    Past medical history is significant for hyperlipidemia.    She works as a licensed therapist.  She does not smoke or utilize drugs.    Review of Systems     Past Medical History:   Diagnosis Date    Anemia     Brain concussion     Headache     Low back pain     Lumbosacral disc disease      History reviewed. No pertinent surgical history.  Family History   Problem Relation Age of Onset    Hyperlipidemia Mother     Hyperlipidemia Brother      Social History     Tobacco Use    Smoking status: Some Days     Types: Cigarettes     Passive exposure: Current    Smokeless tobacco: Never    Tobacco comments:     AVS   Vaping Use    Vaping status: Never Used   Substance Use Topics    Alcohol use: Never    Drug use: Never     (Not in a hospital admission)    Allergies:  Sulfa antibiotics    Objective      Vital  "Signs  Ht 167.6 cm (66\")   Wt 61.4 kg (135 lb 6.4 oz)   BMI 21.85 kg/m²     Physical Exam  Constitutional:       Appearance: Normal appearance. She is well-developed.   HENT:      Head: Normocephalic.   Eyes:      General: Lids are normal.      Conjunctiva/sclera: Conjunctivae normal.      Pupils: Pupils are equal, round, and reactive to light.   Cardiovascular:      Rate and Rhythm: Normal rate.   Pulmonary:      Effort: Pulmonary effort is normal.      Breath sounds: Normal breath sounds.   Musculoskeletal:         General: Tenderness (There is tenderness to palpation L4-5 facets that increases with flexion and extension as well as lateral rotation.  SI joints are not exquisitely tender.) present. Normal range of motion.      Cervical back: Normal range of motion.   Skin:     General: Skin is warm and dry.   Neurological:      Mental Status: She is alert and oriented to person, place, and time.      GCS: GCS eye subscore is 4. GCS verbal subscore is 5. GCS motor subscore is 6.      Cranial Nerves: No cranial nerve deficit.      Sensory: No sensory deficit.      Motor: No weakness.      Gait: Gait is intact. Gait normal.      Deep Tendon Reflexes: Reflexes are normal and symmetric. Reflexes normal.      Reflex Scores:       Tricep reflexes are 2+ on the right side and 2+ on the left side.       Bicep reflexes are 2+ on the right side and 2+ on the left side.       Brachioradialis reflexes are 2+ on the right side and 2+ on the left side.       Patellar reflexes are 2+ on the right side and 2+ on the left side.       Achilles reflexes are 2+ on the right side and 2+ on the left side.  Psychiatric:         Speech: Speech normal.         Behavior: Behavior normal.         Thought Content: Thought content normal.         Neurologic Exam     Mental Status   Oriented to person, place, and time.   Speech: speech is normal   Level of consciousness: alert  Knowledge: good.     Cranial Nerves     CN III, IV, VI   Pupils " are equal, round, and reactive to light.    Motor Exam   Muscle bulk: normal  Overall muscle tone: normal    Strength   Right iliopsoas: 5/5  Left iliopsoas: 5/5  Right quadriceps: 5/5  Left quadriceps: 5/5  Right hamstrin/5  Left hamstrin/5  Right anterior tibial: 5/5  Left anterior tibial: 5/5  Right posterior tibial: 5/5  Left posterior tibial: 5/5  Right gastroc: 5/5  Left gastroc: 5/5No EHL weakness     Sensory Exam   Light touch normal.     Gait, Coordination, and Reflexes     Gait  Gait: normal    Reflexes   Right brachioradialis: 2+  Left brachioradialis: 2+  Right biceps: 2+  Left biceps: 2+  Right triceps: 2+  Left triceps: 2+  Right patellar: 2+  Left patellar: 2+  Right achilles: 2+  Left achilles: 2+  Right : 2+  Left : 2+  Right Lopez: absent  Left Lopez: absent      Imaging review: MRI of the lumbar spine dated 2023 was reviewed and demonstrates well-preserved disc spaces.  There is facet and ligament hypertrophy L4-5 with right foraminal disc herniation.  There is lateral recess stenosis bilaterally and fluid signal in the facet joints.  There is mild central stenosis at L3-4 with facet and ligament hypertrophy and lateral recess stenosis bilaterally, left greater than right.  There is fluid signal in the joints.  No high-grade central or neuroforaminal stenosis throughout.        Assessment/Plan: I reviewed images in detail with Miladys.  She has persistent left-sided low back pain with radiating pain down the left posterior thigh and into the lateral calf.  She has failed to get symptom relief with medications as outlined above, SI joint injections and remote physical therapy.  She is neurologically stable.  She has lateral recess stenosis on the left at both L3-4 and L4-5 with facet hypertrophy and fluid signal in the joints most likely causing her pain and radicular symptoms.    I would like to get flexion-extension views of the lumbar spine to make sure there is no  instability.    I would like to get her set up with pain management for epidural steroid injection to see if this will cover her pain.  We will get her referred to [facility] for a left L4-5 ANDREW.    I would also like to get her in some lumbar physical therapy with traction, 2-3 times a week for 6 weeks.    She did not tolerate gabapentin but we will do a trial of Lyrica 50 mg 3 times daily as needed nerve pain.    She will follow-up with me in about 8 weeks, sooner if she has any issues or concerns.    Should Ms. Bell not have any improvement from physical therapy, I think it would be prudent to send the patient for a new  MRI of the lumbar spine to see if there is anything from a surgical standpoint that needs to be addressed.     I advised the patient to call and return sooner for new or worsening complaints of weakness, paresthesias, gait disturbances, or any additional concerns.  Treatment options discussed in detail with Miladys and the patient voiced understanding.  Ms. Bell agrees with this plan of care.     Patient is a nonsmoker    The patient's Body mass index is 21.85 kg/m².. BMI is within normal parameters. No follow-up required.    Diagnoses and all orders for this visit:    1. Lumbar radiculopathy (Primary)  -     Ambulatory Referral to Physical Therapy  -     Ambulatory Referral to Pain Management  -     pregabalin (Lyrica) 50 MG capsule; Take 1 capsule by mouth 3 (Three) Times a Day As Needed (nerve pain).  Dispense: 90 capsule; Refill: 0    2. Lumbar spondylosis  -     Ambulatory Referral to Physical Therapy  -     XR Spine Lumbar AP & Lateral With Flex & Ext; Future    3. Body mass index (BMI) of 21.0 to 21.9 in adult    4. Smoker          I discussed the patients findings and my recommendations with patient    Kathleen Sifuentes PA-C  05/15/24  13:26 CDT

## 2024-05-21 ENCOUNTER — TELEPHONE (OUTPATIENT)
Dept: NEUROSURGERY | Facility: CLINIC | Age: 54
End: 2024-05-21
Payer: COMMERCIAL

## 2024-05-21 DIAGNOSIS — M54.16 LUMBAR RADICULOPATHY: Primary | ICD-10-CM

## 2024-05-21 NOTE — TELEPHONE ENCOUNTER
spoke to patient to inform her that we sent in referral to pain management with Dr. Weber     It is okay for the hub to relay the message to the patient and schedule if possible

## 2024-07-12 ENCOUNTER — TELEPHONE (OUTPATIENT)
Dept: NEUROSURGERY | Facility: CLINIC | Age: 54
End: 2024-07-12
Payer: COMMERCIAL

## 2024-07-12 NOTE — TELEPHONE ENCOUNTER
called patient in regards to cancelled appt to see if she would like to r/s pt stated she had a change in insurance and once she gets it settled she will call back to r/s

## 2025-04-21 ENCOUNTER — TELEPHONE (OUTPATIENT)
Dept: NEUROSURGERY | Facility: CLINIC | Age: 55
End: 2025-04-21
Payer: COMMERCIAL

## 2025-04-21 NOTE — TELEPHONE ENCOUNTER
lvm to see if patient could come in next tuesday April the 29th at 1:00pm     It is okay for the hub to relay the message to the patient and schedule if possible

## 2025-04-29 ENCOUNTER — OFFICE VISIT (OUTPATIENT)
Dept: NEUROSURGERY | Facility: CLINIC | Age: 55
End: 2025-04-29
Payer: COMMERCIAL

## 2025-04-29 VITALS — BODY MASS INDEX: 22.52 KG/M2 | WEIGHT: 140.1 LBS | HEIGHT: 66 IN

## 2025-04-29 DIAGNOSIS — Z78.9 NONSMOKER: ICD-10-CM

## 2025-04-29 DIAGNOSIS — M54.16 LUMBAR RADICULOPATHY: Primary | ICD-10-CM

## 2025-04-29 DIAGNOSIS — M47.816 LUMBAR SPONDYLOSIS: ICD-10-CM

## 2025-04-29 PROBLEM — F17.200 SMOKER: Status: ACTIVE | Noted: 2025-04-29

## 2025-04-29 RX ORDER — METHYLPREDNISOLONE 4 MG/1
TABLET ORAL
Qty: 21 TABLET | Refills: 1 | Status: SHIPPED | OUTPATIENT
Start: 2025-04-29

## 2025-04-29 NOTE — PROGRESS NOTES
"    Chief complaint:   Chief Complaint   Patient presents with    Follow-up     Pt reports to office for worsening pain in her back and down her legs. Had PT at [facility] for 3 months.         Subjective     HPI: Miladys comes in today with continued complaints of chronic back pain with radiating pain into the left buttock down the left lateral thigh into the lateral calf.  I saw her for same symptoms almost 1 year ago.  I got her set up with pain management.  She had trigger point injections, SI joint injections and LESI.  Nothing provided any sustained symptom relief although the LESI worked best, she only had about 2 weeks of symptom relief maximum.  She was in the ER 2 weeks ago after waking up in the middle of the night with severe lower back pain with radiation bilateral iliac crest.  At that time she was also having some radiating pain into the right buttock into the right medial thigh and medial lower leg into the ankle.  That resolved after about 6 days but she continues with her left-sided symptoms.  She denies any focal weakness and paresthesias.  She attended a total of 3 months of physical therapy at [facility] and dry from [facility], finishing up in November 2024.  She found physical therapy beneficial and transition to home exercises which she has continued.    She did not tolerate Lyrica and has not tolerated gabapentin in the past.  She is currently utilizing ibuprofen.  She is also tried a host of muscle relaxers without significant symptom improvement.  Her primary does give her some Norco to keep on hand for intermittent use.    Review of Systems      Objective      Vital Signs  Ht 167.6 cm (65.98\")   Wt 63.5 kg (140 lb 1.6 oz)   BMI 22.62 kg/m²     Physical Exam  Constitutional:       Appearance: Normal appearance. She is well-developed.   HENT:      Head: Normocephalic.   Eyes:      General: Lids are normal.      Conjunctiva/sclera: Conjunctivae normal.      Pupils: Pupils are equal, round, and reactive " to light.   Cardiovascular:      Rate and Rhythm: Normal rate.   Pulmonary:      Effort: Pulmonary effort is normal.      Breath sounds: Normal breath sounds.   Musculoskeletal:         General: No tenderness (SI joints are not exquisitely tender). Normal range of motion.      Cervical back: Normal range of motion.   Skin:     General: Skin is warm and dry.   Neurological:      Mental Status: She is oriented to person, place, and time.      GCS: GCS eye subscore is 4. GCS verbal subscore is 5. GCS motor subscore is 6.      Cranial Nerves: No cranial nerve deficit.      Sensory: No sensory deficit.      Motor: Motor strength is normal.No weakness.      Gait: Gait normal.      Deep Tendon Reflexes: Reflexes are normal and symmetric. Reflexes normal.   Psychiatric:         Speech: Speech normal.         Behavior: Behavior normal.         Thought Content: Thought content normal.         Neurological Exam  Mental Status  Awake, alert and oriented to person, place and time. Oriented to person, place, and time. Speech is normal.    Cranial Nerves  CN III, IV, VI: Normal lids and orbits bilaterally. Pupils equal round and reactive to light bilaterally.    Motor  Normal muscle bulk throughout. Strength is 5/5 throughout all four extremities.    Sensory  Sensation is intact to light touch, pinprick, vibration and proprioception in all four extremities.    Reflexes  Deep tendon reflexes are 2+ and symmetric in all four extremities.    Gait   Normal gait.Casual gait is normal including stance, stride, and arm swing.       Imaging review: No new images        Assessment/Plan: Miladys had recent episode of acute on chronic lumbar radiculopathy with new symptoms of radiating pain from the right buttock to the medial thigh and lower leg.  Fortunately that has improved but she continues with radicular pain on the left and is failed to get symptom resolution with extensive conservative treatment as noted above.    To better assess  her issues, I would like to proceed with a new MRI of the lumbar spine to see if there is any significant stenosis that needs addressed surgically.      I offered her a Medrol Dose Pack today to assist with pain.    I would also like to get her restarted in PT, lumbar Tx modalities to see if this will help with some of her pain.    She will follow-up with Dr. Morejon after new MRI is completed for review.    If she develops any worsening pain, focal weakness or other issues or concerns before that appointment, she will call the office for sooner visit.      Patient is a smoker. Smoking cessation classes given to the patient    The patient's Body mass index is 22.62 kg/m².. BMI is within normal parameters. No follow-up required.    Diagnoses and all orders for this visit:    1. Lumbar radiculopathy (Primary)  -     MRI Lumbar Spine Without Contrast; Future  -     Ambulatory Referral to Physical Therapy for Evaluation & Treatment    2. Lumbar spondylosis  -     methylPREDNISolone (MEDROL) 4 MG dose pack; Take as directed on package instructions.  Dispense: 21 tablet; Refill: 1  -     Ambulatory Referral to Physical Therapy for Evaluation & Treatment    3. Body mass index (BMI) of 22.0-22.9 in adult    4. Smoker        I discussed the patients findings and my recommendations with patient  Kathleen Sifuentes PA-C  04/29/25  13:28 CDT

## 2025-05-17 ENCOUNTER — HOSPITAL ENCOUNTER (OUTPATIENT)
Dept: MRI IMAGING | Facility: HOSPITAL | Age: 55
Discharge: HOME | End: 2025-05-17
Payer: COMMERCIAL

## 2025-05-17 DIAGNOSIS — M54.16 LUMBAR RADICULOPATHY: ICD-10-CM

## 2025-05-17 PROCEDURE — 72148 MRI LUMBAR SPINE W/O DYE: CPT

## (undated) DEVICE — ENDO KIT,LOURDES HOSPITAL: Brand: MEDLINE INDUSTRIES, INC.

## (undated) DEVICE — FORCEPS BX 240CM 2.4MM L NDL RAD JAW 4 M00513334